# Patient Record
Sex: FEMALE | Race: WHITE | NOT HISPANIC OR LATINO | Employment: OTHER | URBAN - METROPOLITAN AREA
[De-identification: names, ages, dates, MRNs, and addresses within clinical notes are randomized per-mention and may not be internally consistent; named-entity substitution may affect disease eponyms.]

---

## 2017-01-04 ENCOUNTER — GENERIC CONVERSION - ENCOUNTER (OUTPATIENT)
Dept: OTHER | Facility: OTHER | Age: 67
End: 2017-01-04

## 2017-01-05 ENCOUNTER — GENERIC CONVERSION - ENCOUNTER (OUTPATIENT)
Dept: OTHER | Facility: OTHER | Age: 67
End: 2017-01-05

## 2017-01-31 ENCOUNTER — GENERIC CONVERSION - ENCOUNTER (OUTPATIENT)
Dept: OTHER | Facility: OTHER | Age: 67
End: 2017-01-31

## 2017-03-02 ENCOUNTER — GENERIC CONVERSION - ENCOUNTER (OUTPATIENT)
Dept: OTHER | Facility: OTHER | Age: 67
End: 2017-03-02

## 2017-03-09 ENCOUNTER — GENERIC CONVERSION - ENCOUNTER (OUTPATIENT)
Dept: OTHER | Facility: OTHER | Age: 67
End: 2017-03-09

## 2017-05-22 ENCOUNTER — GENERIC CONVERSION - ENCOUNTER (OUTPATIENT)
Dept: OTHER | Facility: OTHER | Age: 67
End: 2017-05-22

## 2017-06-19 ENCOUNTER — ALLSCRIPTS OFFICE VISIT (OUTPATIENT)
Dept: OTHER | Facility: OTHER | Age: 67
End: 2017-06-19

## 2017-08-29 ENCOUNTER — ALLSCRIPTS OFFICE VISIT (OUTPATIENT)
Dept: OTHER | Facility: OTHER | Age: 67
End: 2017-08-29

## 2017-08-29 DIAGNOSIS — C73 MALIGNANT NEOPLASM OF THYROID GLAND (HCC): ICD-10-CM

## 2017-09-02 ENCOUNTER — APPOINTMENT (OUTPATIENT)
Dept: LAB | Facility: CLINIC | Age: 67
End: 2017-09-02
Payer: MEDICARE

## 2017-09-02 DIAGNOSIS — C73 MALIGNANT NEOPLASM OF THYROID GLAND (HCC): ICD-10-CM

## 2017-09-02 LAB
T3 SERPL-MCNC: 1.1 NG/ML (ref 0.6–1.8)
T4 FREE SERPL-MCNC: 1.34 NG/DL (ref 0.76–1.46)
TSH SERPL DL<=0.05 MIU/L-ACNC: 0.01 UIU/ML (ref 0.36–3.74)

## 2017-09-02 PROCEDURE — 36415 COLL VENOUS BLD VENIPUNCTURE: CPT

## 2017-09-02 PROCEDURE — 84480 ASSAY TRIIODOTHYRONINE (T3): CPT

## 2017-09-02 PROCEDURE — 84443 ASSAY THYROID STIM HORMONE: CPT

## 2017-09-02 PROCEDURE — 84432 ASSAY OF THYROGLOBULIN: CPT

## 2017-09-02 PROCEDURE — 84439 ASSAY OF FREE THYROXINE: CPT

## 2017-09-02 PROCEDURE — 86800 THYROGLOBULIN ANTIBODY: CPT

## 2017-09-06 LAB
THYROGLOB AB SERPL-ACNC: <1 IU/ML (ref 0–0.9)
THYROGLOB SERPL-MCNC: <0.1 NG/ML (ref 1.5–38.5)

## 2017-09-11 ENCOUNTER — GENERIC CONVERSION - ENCOUNTER (OUTPATIENT)
Dept: OTHER | Facility: OTHER | Age: 67
End: 2017-09-11

## 2018-01-11 NOTE — RESULT NOTES
Verified Results  (1) THIN PREP PAP WITH IMAGING 13Jun2016 02:10PM Luciano Puga Order Number: [id number]     Test Name Result Flag Reference   LAB AP CASE REPORT (Report)     Gynecologic Cytology Report            Case: [id number]                  Authorizing Provider: Lele Seaman MD     Collected:      06/13/2016 1410        First Screen:     NUBIA Li Received:      06/15/2016 1518        Specimen:  LIQUID-BASED PAP, SCREENING, Cervix   LAB AP GYN PRIMARY INTERPRETATION      Negative for intraepithelial lesion or malignancy  Electronically signed by NUBIA Li on 6/22/2016 at 11:34 AM   LAB AP GYN SPECIMEN ADEQUACY      Satisfactory for evaluation  Endocervical/transformation zone component present  LAB AP GYN ADDITIONAL INFORMATION (Report)     [employer]'s FDA approved ,  and ThinPrep Imaging System are   utilized with strict adherence to the 's instruction manual to   prepare gynecologic and non-gynecologic cytology specimens for the   production of ThinPrep slides as well as for gynecologic ThinPrep imaging  These processes have been validated by our laboratory and/or by the     The Pap test is not a diagnostic procedure and should not be used as the   sole means to detect cervical cancer  It is only a screening procedure to   aid in the detection of cervical cancer and its precursors  Both   false-negative and false-positive results have been experienced  Your   patient's test result should be interpreted in this context together with   the history and clinical findings

## 2018-01-12 NOTE — RESULT NOTES
Verified Results  (1) T3 TOTAL [id number] 08:53AM Gatito Elizalde Order Number: [id number]     Test Name Result Flag Reference   T3 1 1 ng/mL  0 6-1 8     (1) T4, FREE [id number] 08:53AM Gatito Elizalde Order Number: [id number]     Test Name Result Flag Reference   T4,FREE 1 31 ng/dL  0 76-1 46     (1) THYROGLOBULIN/QUANT W/ANTIBODY PANEL [id number] 08:53AM Gatitomiko Elizalde Order Number: [id number]     Test Name Result Flag Reference   THYROGLOB AB <1 0 IU/mL  0 0 - 0 9   Thyroglobulin Antibody measured by [facility] Methodology    Performed at:  [address] [place] [address]  [id number]  : Yasmeen Andrews MD, Phone:  [phone]   THYROGLOBULIN-ARMAAN <0 1 ng/mL L 1 5 - 38 5   According to the [facility] of Clinical Biochemistry, the  reference interval for Thyroglobulin (TG) should be related to  euthyroid patients and not for patients who underwent thyroidectomy  TG reference intervals for these patients depend on the residual  mass of the thyroid tissue left after surgery  Establishing a  post-operative baseline is recommended    The assay limit of quantitation is 0 1 ng/mL  Thyroglobulin measured by [facility] Immunometric Assay    Performed at:  [address]  [id number]  : Yasmeen Andrews MD, Phone:  [phone]

## 2018-01-13 VITALS
DIASTOLIC BLOOD PRESSURE: 82 MMHG | HEIGHT: 62 IN | WEIGHT: 181.5 LBS | BODY MASS INDEX: 33.4 KG/M2 | SYSTOLIC BLOOD PRESSURE: 142 MMHG

## 2018-01-13 VITALS
WEIGHT: 184 LBS | HEIGHT: 62 IN | SYSTOLIC BLOOD PRESSURE: 158 MMHG | BODY MASS INDEX: 33.86 KG/M2 | DIASTOLIC BLOOD PRESSURE: 86 MMHG

## 2018-01-13 NOTE — RESULT NOTES
Verified Results  (1) COMPREHENSIVE METABOLIC PANEL [id number] 75:93KJ [employer] Order Number: [id number]     Test Name Result Flag Reference   GLUCOSE,RANDM 82 mg/dL     If the patient is fasting, the ADA then defines impaired fasting glucose as > 100 mg/dL and diabetes as > or equal to 123 mg/dL  SODIUM 138 mmol/L  136-145   POTASSIUM 4 7 mmol/L  3 5-5 3   CHLORIDE 104 mmol/L  100-108   CARBON DIOXIDE 29 mmol/L  21-32   ANION GAP (CALC) 5 mmol/L  4-13   BLOOD UREA NITROGEN 24 mg/dL  5-25   CREATININE 0 85 mg/dL  0 60-1 30   Standardized to IDMS reference method   CALCIUM 8 9 mg/dL  8 3-10 1   BILI, TOTAL 0 36 mg/dL  0 20-1 00   ALK PHOSPHATAS 100 U/L     ALT (SGPT) 29 U/L  12-78   AST(SGOT) 13 U/L  5-45   ALBUMIN 3 9 g/dL  3 5-5 0   TOTAL PROTEIN 7 8 g/dL  6 4-8 2   eGFR Non-African American      >60 0 ml/min/1 73sq m   - Patient Instructions: This is a fasting blood test  Water,black tea or black  coffee only after 9:00pm the night before test Drink 2 glasses of water the morning of test - Patient Instructions: This bloodwork is non-fasting  Please drink two glasses of   water morning of bloodwork  National Kidney Disease Education Program recommendations are as follows:  GFR calculation is accurate only with a steady state creatinine  Chronic Kidney disease less than 60 ml/min/1 73 sq  meters  Kidney failure less than 15 ml/min/1 73 sq  meters  (1) LIPID PANEL, FASTING [id number] 21:59BC [employer] Order Number: [id number]     Test Name Result Flag Reference   CHOLESTEROL 209 mg/dL H    HDL,DIRECT 62 mg/dL H 40-60   Specimen collection should occur prior to Metamizole administration due to the potential for falsely depressed results  LDL CHOLESTEROL CALCULATED 131 mg/dL H 0-100   - Patient Instructions:  This is a fasting blood test  Water,black tea or black  coffee only after 9:00pm the night before test   Drink 2 glasses of water the morning of test     - Patient Instructions: This is a fasting blood test  Water,black tea or black  coffee only after 9:00pm the night before test Drink 2 glasses of water the morning of test - Patient Instructions: This bloodwork is non-fasting  Please drink two glasses of   water morning of bloodwork  Triglyceride:         Normal              <150 mg/dl       Borderline High    150-199 mg/dl       High               200-499 mg/dl       Very High          >499 mg/dl  Cholesterol:         Desirable        <200 mg/dl      Borderline High  200-239 mg/dl      High             >239 mg/dl  HDL Cholesterol:        High    >59 mg/dL      Low     <41 mg/dL  LDL CALCULATED:    This screening LDL is a calculated result  It does not have the accuracy of the Direct Measured LDL in the monitoring of patients with hyperlipidemia and/or statin therapy  Direct Measure LDL (MJK137) must be ordered separately in these patients  TRIGLYCERIDES 81 mg/dL  <=150   Specimen collection should occur prior to N-Acetylcysteine or Metamizole administration due to the potential for falsely depressed results  (1) TSH [id number] 00:58TL [facility] Order Number: [id number]     Test Name Result Flag Reference   TSH <0 007 uIU/mL L 0 358-3 740   - Patient Instructions: This bloodwork is non-fasting  Please drink two glasses of water morning of bloodwork  - Patient Instructions: This is a fasting blood test  Water,black tea or black  coffee only after 9:00pm the night before test Drink 2 glasses of water the morning of test - Patient Instructions: This bloodwork is non-fasting  Please drink two glasses of   water morning of bloodwork  Patients undergoing fluorescein dye angiography may retain small amounts of fluorescein in the body for 48-72 hours post procedure  Samples containing fluorescein can produce falsely depressed TSH values   If the patient had this procedure,a specimen should be resubmitted post fluorescein clearance  The recommended reference ranges for TSH during pregnancy are as follows:  First trimester 0 1 to 2 5 uIU/mL  Second trimester  0 2 to 3 0 uIU/mL  Third trimester 0 3 to 3 0 uIU/m       Discussion/Summary   Blood work shows she needs reduction in thyroid medication  I will send in for 75 Âµg tablet  Recheck blood work in 4 months    Also her cholesterol is still slightly elevated patient increase pravastatin to 40 mg once daily I will send in to pharmacy

## 2018-01-16 NOTE — RESULT NOTES
Verified Results  (1) T3 TOTAL 02Sep2017 07:44AM Glendy Rona Order Number: [id number]     Test Name Result Flag Reference   T3 1 10 ng/mL  0 60-1 80     (1) T4, FREE 02Sep2017 07:44AM Glendy Rona Order Number: [id number]     Test Name Result Flag Reference   T4,FREE 1 34 ng/dL  0 76-1 46   Specimen collection should occur prior to Sulfasalazine administration due to the potential for falsely elevated results  (1) THYROGLOBULIN/QUANT W/ANTIBODY PANEL 02Sep2017 07:44AM Glendy Rona Order Number: [id number]     Test Name Result Flag Reference   THYROGLOB AB <1 0 IU/mL  0 0 - 0 9   Thyroglobulin Antibody measured by [facility] Methodology    Performed at:  [address] [place] [address]  [id number]  : Steve Burch MD, Phone:  [phone]   THYROGLOBULIN-ARMAAN <0 1 ng/mL L 1 5 - 38 5   According to the [facility] of Clinical Biochemistry, the  reference interval for Thyroglobulin (TG) should be related to  euthyroid patients and not for patients who underwent thyroidectomy  TG reference intervals for these patients depend on the residual  mass of the thyroid tissue left after surgery  Establishing a  post-operative baseline is recommended  The assay limit of quantitation is 0 1 ng/mL  Thyroglobulin measured by [facility] Immunometric Assay    Performed at:  [address] [place] [address]  [id number]  : Steve Burch MD, Phone:  [phone]     (1) TSH [id number] 07:44AM Glendy Rona Order Number: [id number]     Test Name Result Flag Reference   TSH 0 010 uIU/mL L 0 358-3 740   Patients undergoing fluorescein dye angiography may retain small amounts of fluorescein in the body for 48-72 hours post procedure  Samples containing fluorescein can produce falsely depressed TSH values   If the patient had this procedure,a specimen should be resubmitted post fluorescein clearance            The recommended reference ranges for TSH during pregnancy are as follows:  First trimester 0 1 to 2 5 uIU/mL  Second trimester  0 2 to 3 0 uIU/mL  Third trimester 0 3 to 3 0 uIU/m

## 2018-05-22 DIAGNOSIS — Z12.31 ENCOUNTER FOR SCREENING MAMMOGRAM FOR MALIGNANT NEOPLASM OF BREAST: ICD-10-CM

## 2018-06-25 ENCOUNTER — ANNUAL EXAM (OUTPATIENT)
Dept: OBGYN CLINIC | Facility: CLINIC | Age: 68
End: 2018-06-25
Payer: MEDICARE

## 2018-06-25 VITALS
WEIGHT: 181 LBS | BODY MASS INDEX: 33.31 KG/M2 | HEIGHT: 62 IN | SYSTOLIC BLOOD PRESSURE: 140 MMHG | DIASTOLIC BLOOD PRESSURE: 80 MMHG

## 2018-06-25 DIAGNOSIS — Z12.31 ENCOUNTER FOR SCREENING MAMMOGRAM FOR MALIGNANT NEOPLASM OF BREAST: Primary | ICD-10-CM

## 2018-06-25 DIAGNOSIS — Z01.419 ENCOUNTER FOR GYNECOLOGICAL EXAMINATION (GENERAL) (ROUTINE) WITHOUT ABNORMAL FINDINGS: ICD-10-CM

## 2018-06-25 PROCEDURE — G0145 SCR C/V CYTO,THINLAYER,RESCR: HCPCS | Performed by: OBSTETRICS & GYNECOLOGY

## 2018-06-25 PROCEDURE — G0101 CA SCREEN;PELVIC/BREAST EXAM: HCPCS | Performed by: OBSTETRICS & GYNECOLOGY

## 2018-06-25 RX ORDER — LOSARTAN POTASSIUM 100 MG/1
1 TABLET ORAL DAILY
COMMUNITY
Start: 2014-08-11 | End: 2019-01-14 | Stop reason: SDUPTHER

## 2018-06-25 RX ORDER — LEVOTHYROXINE SODIUM 0.1 MG/1
1 TABLET ORAL DAILY
COMMUNITY
Start: 2015-09-16 | End: 2018-12-26 | Stop reason: SDUPTHER

## 2018-06-25 RX ORDER — PRAVASTATIN SODIUM 40 MG
1 TABLET ORAL DAILY
COMMUNITY
Start: 2014-08-20 | End: 2018-07-30 | Stop reason: SDUPTHER

## 2018-06-25 RX ORDER — ASPIRIN 81 MG/1
TABLET ORAL
COMMUNITY
End: 2020-07-09

## 2018-06-25 RX ORDER — UBIDECARENONE 50 MG
CAPSULE ORAL
COMMUNITY

## 2018-06-25 RX ORDER — CHLORAL HYDRATE 500 MG
CAPSULE ORAL
COMMUNITY
Start: 2014-08-11

## 2018-06-25 NOTE — PATIENT INSTRUCTIONS
This 6 exam are normal  She will call if she sees any vaginal bleeding over the next year    9year-old patient was told that her breast and pelvic

## 2018-06-25 NOTE — PROGRESS NOTES
Assessment/Plan: This 59-year-old patient is seen for annual gyn evaluation  She has no complaints at this time  No problem-specific Assessment & Plan notes found for this encounter  Subjective:      Patient ID: Arben Farrar is a 79 y o  female  This 59-year-old patient has had no vaginal bleeding or episodes of vaginitis over the past year  Keota is comfortable with the use of lubricant  She has noticed decreased frequency of hot flashes over the past year  She has no chronic headaches or fainting spells  She usually is in bed at 10 at night 0 6 in the morning  Her bowel function is normal   She has had no urinary tract infections and does not lose urine when she laughs or coughs  She does not wear liners or pads every day  Review of Systems   Constitutional: Negative  HENT: Negative  Eyes: Negative  Respiratory: Negative  Cardiovascular: Negative  Gastrointestinal: Negative  Endocrine: Negative  Genitourinary: Negative  Musculoskeletal: Negative  Skin: Negative  Allergic/Immunologic: Negative  Neurological: Negative  Hematological: Negative  Psychiatric/Behavioral: Negative  Objective:      /80 (BP Location: Right arm, Patient Position: Sitting, Cuff Size: Standard)   Ht 5' 1 5" (1 562 m)   Wt 82 1 kg (181 lb)   BMI 33 65 kg/m²          Physical Exam   Constitutional: She is oriented to person, place, and time  She appears well-developed and well-nourished  HENT:   Head: Normocephalic  Neck: Normal range of motion  Neck supple  Cardiovascular: Normal rate, regular rhythm, normal heart sounds and intact distal pulses  Pulmonary/Chest: Effort normal and breath sounds normal    Abdominal: Soft  Bowel sounds are normal    Genitourinary: Uterus normal    Musculoskeletal: Normal range of motion  Neurological: She is alert and oriented to person, place, and time  Skin: Skin is warm and dry     Psychiatric: She has a normal mood and affect  Nursing note and vitals reviewed  breast exam is normal   Pelvic exam reveals uterus to be anterior mobile normal size  No adnexal masses are present  Cervix is normal to appearance  There is no blood or discharge in the vagina  The vulva is normal  Rectal exam shows no blood or  masses in the rectum and no nodularity in the cul-de-sac

## 2018-06-29 LAB
LAB AP GYN PRIMARY INTERPRETATION: NORMAL
Lab: NORMAL

## 2018-07-19 DIAGNOSIS — E78.5 HYPERLIPIDEMIA, UNSPECIFIED HYPERLIPIDEMIA TYPE: Primary | ICD-10-CM

## 2018-07-19 RX ORDER — PRAVASTATIN SODIUM 40 MG
TABLET ORAL
Qty: 90 TABLET | Refills: 1 | OUTPATIENT
Start: 2018-07-19

## 2018-07-19 NOTE — TELEPHONE ENCOUNTER
Patient is not been seen since 2016  She would need office visit as well as blood test for further evaluation before we could authorized any medications    I will print out prescription for patient

## 2018-07-26 ENCOUNTER — APPOINTMENT (OUTPATIENT)
Dept: LAB | Facility: CLINIC | Age: 68
End: 2018-07-26
Payer: MEDICARE

## 2018-07-26 DIAGNOSIS — E78.5 HYPERLIPIDEMIA, UNSPECIFIED HYPERLIPIDEMIA TYPE: ICD-10-CM

## 2018-07-26 LAB
ALBUMIN SERPL BCP-MCNC: 3.7 G/DL (ref 3.5–5)
ALP SERPL-CCNC: 101 U/L (ref 46–116)
ALT SERPL W P-5'-P-CCNC: 31 U/L (ref 12–78)
ANION GAP SERPL CALCULATED.3IONS-SCNC: 8 MMOL/L (ref 4–13)
AST SERPL W P-5'-P-CCNC: 17 U/L (ref 5–45)
BILIRUB SERPL-MCNC: 0.46 MG/DL (ref 0.2–1)
BUN SERPL-MCNC: 17 MG/DL (ref 5–25)
CALCIUM SERPL-MCNC: 8.5 MG/DL (ref 8.3–10.1)
CHLORIDE SERPL-SCNC: 105 MMOL/L (ref 100–108)
CHOLEST SERPL-MCNC: 175 MG/DL (ref 50–200)
CO2 SERPL-SCNC: 26 MMOL/L (ref 21–32)
CREAT SERPL-MCNC: 0.71 MG/DL (ref 0.6–1.3)
ERYTHROCYTE [DISTWIDTH] IN BLOOD BY AUTOMATED COUNT: 12.8 % (ref 11.6–15.1)
GFR SERPL CREATININE-BSD FRML MDRD: 88 ML/MIN/1.73SQ M
GLUCOSE P FAST SERPL-MCNC: 89 MG/DL (ref 65–99)
HCT VFR BLD AUTO: 39.8 % (ref 34.8–46.1)
HDLC SERPL-MCNC: 49 MG/DL (ref 40–60)
HGB BLD-MCNC: 12.7 G/DL (ref 11.5–15.4)
LDLC SERPL CALC-MCNC: 102 MG/DL (ref 0–100)
MCH RBC QN AUTO: 29.5 PG (ref 26.8–34.3)
MCHC RBC AUTO-ENTMCNC: 31.9 G/DL (ref 31.4–37.4)
MCV RBC AUTO: 92 FL (ref 82–98)
NONHDLC SERPL-MCNC: 126 MG/DL
PLATELET # BLD AUTO: 291 THOUSANDS/UL (ref 149–390)
PMV BLD AUTO: 10.1 FL (ref 8.9–12.7)
POTASSIUM SERPL-SCNC: 4.3 MMOL/L (ref 3.5–5.3)
PROT SERPL-MCNC: 7.3 G/DL (ref 6.4–8.2)
RBC # BLD AUTO: 4.31 MILLION/UL (ref 3.81–5.12)
SODIUM SERPL-SCNC: 139 MMOL/L (ref 136–145)
TRIGL SERPL-MCNC: 120 MG/DL
TSH SERPL DL<=0.05 MIU/L-ACNC: 0.01 UIU/ML (ref 0.36–3.74)
WBC # BLD AUTO: 7.27 THOUSAND/UL (ref 4.31–10.16)

## 2018-07-26 PROCEDURE — 80053 COMPREHEN METABOLIC PANEL: CPT

## 2018-07-26 PROCEDURE — 80061 LIPID PANEL: CPT

## 2018-07-26 PROCEDURE — 84443 ASSAY THYROID STIM HORMONE: CPT

## 2018-07-26 PROCEDURE — 36415 COLL VENOUS BLD VENIPUNCTURE: CPT

## 2018-07-26 PROCEDURE — 85027 COMPLETE CBC AUTOMATED: CPT

## 2018-07-30 ENCOUNTER — OFFICE VISIT (OUTPATIENT)
Dept: FAMILY MEDICINE CLINIC | Facility: CLINIC | Age: 68
End: 2018-07-30
Payer: MEDICARE

## 2018-07-30 VITALS
SYSTOLIC BLOOD PRESSURE: 110 MMHG | DIASTOLIC BLOOD PRESSURE: 70 MMHG | HEART RATE: 84 BPM | WEIGHT: 179.6 LBS | BODY MASS INDEX: 33.05 KG/M2 | RESPIRATION RATE: 16 BRPM | HEIGHT: 62 IN | TEMPERATURE: 96.7 F

## 2018-07-30 DIAGNOSIS — E05.90 SUBCLINICAL HYPERTHYROIDISM: ICD-10-CM

## 2018-07-30 DIAGNOSIS — I10 BENIGN ESSENTIAL HYPERTENSION: ICD-10-CM

## 2018-07-30 DIAGNOSIS — E78.5 HYPERLIPIDEMIA, UNSPECIFIED HYPERLIPIDEMIA TYPE: Primary | ICD-10-CM

## 2018-07-30 PROCEDURE — 99214 OFFICE O/P EST MOD 30 MIN: CPT | Performed by: FAMILY MEDICINE

## 2018-07-30 RX ORDER — PRAVASTATIN SODIUM 40 MG
40 TABLET ORAL DAILY
Qty: 90 TABLET | Refills: 3 | Status: SHIPPED | OUTPATIENT
Start: 2018-07-30 | End: 2019-07-01 | Stop reason: SDUPTHER

## 2018-07-30 NOTE — ASSESSMENT & PLAN NOTE
Hypertension    Patient blood pressure is stable at this time and she will continue current regimen of medications

## 2018-07-30 NOTE — ASSESSMENT & PLAN NOTE
Hyperlipidemia  Patient's lipid panel stable on current dose of statin therapy    A refill was placed for her Pravachol medication

## 2018-07-30 NOTE — PROGRESS NOTES
FAMILY PRACTICE OFFICE VISIT       NAME: Dionna Other  AGE: 79 y o  SEX: female       : 1950        MRN: [id number]    DATE: 2018  TIME: 5:21 PM    Assessment and Plan     Problem List Items Addressed This Visit     Benign essential hypertension       Hypertension  Patient blood pressure is stable at this time and she will continue current regimen of medications         Hyperlipidemia - Primary      Hyperlipidemia  Patient's lipid panel stable on current dose of statin therapy  A refill was placed for her Pravachol medication         Relevant Medications    pravastatin (PRAVACHOL) 40 mg tablet    Subclinical hyperthyroidism      Subclinical hyperthyroidism  Patient with no specific symptoms related to hyperthyroidism  She will follow up with her general surgeon and inquire whether she needs an adjustment on her levothyroxine medication                 There are no Patient Instructions on file for this visit  Chief Complaint     Chief Complaint   Patient presents with    Follow-up     Patient is here for a follow up to discuss her blood work results  History of Present Illness      Patient denies any recent illness  She still sees her general surgeon for her previous thyroid condition  I reviewed her most recent blood test results  She still sees her gynecologist on a regular basis  Review of Systems   Review of Systems   Constitutional: Negative  HENT:        As per HPI   Respiratory: Negative  Cardiovascular: Negative  Gastrointestinal: Negative  Genitourinary: Negative  Musculoskeletal: Negative  Neurological: Negative  Psychiatric/Behavioral: Negative          Active Problem List     Patient Active Problem List   Diagnosis    Benign essential hypertension    [facility]    Hyperlipidemia    Subclinical hyperthyroidism       Past Medical History:  Past Medical History:   Diagnosis Date    Carcinoma of thyroid ([facility] )     Last Assessed:  8/29/17       Past Surgical History:  Past Surgical History:   Procedure Laterality Date    DIAGNOSTIC LAPAROSCOPY      EXPLORATORY LAPAROTOMY      SALPINGOOPHORECTOMY Left     TOTAL THYROIDECTOMY      TUBAL LIGATION         Family History:  Family History   Problem Relation Age of Onset    Colon cancer Mother     Heart disease Father     Other Father         Stroke syndrome    Colon cancer Brother     Other Son         Brain tumor       Social History:  Social History     Social History    Marital status: /Civil Union     Spouse name: N/A    Number of children: N/A    Years of education: N/A     Occupational History    retired      Social History Main Topics    Smoking status: Never Smoker    Smokeless tobacco: Never Used    Alcohol use Yes      Comment: Social drinker    Drug use: No    Sexual activity: Yes     Partners: Male     Other Topics Concern    Not on file     Social History Narrative    Caffeine use    Uses safety equipment - seatbelts       Objective     Vitals:    07/30/18 1018   BP: 110/70   Pulse:    Resp:    Temp:      Wt Readings from Last 3 Encounters:   07/30/18 81 5 kg (179 lb 9 6 oz)   06/25/18 82 1 kg (181 lb)   08/29/17 82 3 kg (181 lb 8 oz)       Physical Exam   Constitutional: No distress  HENT:   Mouth/Throat: Oropharynx is clear and moist  No oropharyngeal exudate  Cardiovascular: Normal rate and regular rhythm  Pulmonary/Chest: Effort normal and breath sounds normal  No respiratory distress  She has no wheezes  She has no rales  Abdominal: Soft  Bowel sounds are normal  She exhibits no distension and no mass  There is no tenderness  There is no rebound and no guarding  Musculoskeletal: She exhibits no edema  Lymphadenopathy:     She has no cervical adenopathy         Pertinent Laboratory/Diagnostic Studies:  Lab Results   Component Value Date    GLUCOSE 82 08/12/2016    BUN 17 07/26/2018    CREATININE 0 71 07/26/2018    CALCIUM 8 5 07/26/2018     07/26/2018    K 4 3 07/26/2018    CO2 26 07/26/2018     07/26/2018     Lab Results   Component Value Date    ALT 31 07/26/2018    AST 17 07/26/2018    ALKPHOS 101 07/26/2018    BILITOT 0 46 07/26/2018       Lab Results   Component Value Date    WBC 7 27 07/26/2018    HGB 12 7 07/26/2018    HCT 39 8 07/26/2018    MCV 92 07/26/2018     07/26/2018       No results found for: TSH    Lab Results   Component Value Date    CHOL 175 07/26/2018     Lab Results   Component Value Date    TRIG 120 07/26/2018     Lab Results   Component Value Date    HDL 49 07/26/2018     Lab Results   Component Value Date    LDLCALC 102 (H) 07/26/2018     No results found for: HGBA1C    Results for orders placed or performed in visit on 07/26/18   CBC   Result Value Ref Range    WBC 7 27 4 31 - 10 16 Thousand/uL    RBC 4 31 3 81 - 5 12 Million/uL    Hemoglobin 12 7 11 5 - 15 4 g/dL    Hematocrit 39 8 34 8 - 46 1 %    MCV 92 82 - 98 fL    MCH 29 5 26 8 - 34 3 pg    MCHC 31 9 31 4 - 37 4 g/dL    RDW 12 8 11 6 - 15 1 %    Platelets 163 519 - 212 Thousands/uL    MPV 10 1 8 9 - 12 7 fL   Comprehensive metabolic panel   Result Value Ref Range    Sodium 139 136 - 145 mmol/L    Potassium 4 3 3 5 - 5 3 mmol/L    Chloride 105 100 - 108 mmol/L    CO2 26 21 - 32 mmol/L    Anion Gap 8 4 - 13 mmol/L    BUN 17 5 - 25 mg/dL    Creatinine 0 71 0 60 - 1 30 mg/dL    Glucose, Fasting 89 65 - 99 mg/dL    Calcium 8 5 8 3 - 10 1 mg/dL    AST 17 5 - 45 U/L    ALT 31 12 - 78 U/L    Alkaline Phosphatase 101 46 - 116 U/L    Total Protein 7 3 6 4 - 8 2 g/dL    Albumin 3 7 3 5 - 5 0 g/dL    Total Bilirubin 0 46 0 20 - 1 00 mg/dL    eGFR 88 ml/min/1 73sq m   Lipid panel   Result Value Ref Range    Cholesterol 175 50 - 200 mg/dL    Triglycerides 120 <=150 mg/dL    HDL, Direct 49 40 - 60 mg/dL    LDL Calculated 102 (H) 0 - 100 mg/dL    Non-HDL-Chol (CHOL-HDL) 126 mg/dl   TSH, 3rd generation   Result Value Ref Range    TSH 3RD GENERATON 0 007 (L) 0 358 - 3 740 uIU/mL       No orders of the defined types were placed in this encounter  ALLERGIES:  No Known Allergies    Current Medications     Current Outpatient Prescriptions   Medication Sig Dispense Refill    aspirin (ADULT ASPIRIN EC LOW STRENGTH) 81 mg EC tablet Take by mouth      Glucosamine-Chondroit-Vit C-Mn (TH GLUCOSAMINE-CHONDROITIN) CAPS Take by mouth      levothyroxine 100 mcg tablet Take 1 tablet by mouth daily      losartan (COZAAR) 100 MG tablet Take 1 tablet by mouth daily      MULTIPLE VITAMINS PO Take by mouth      Omega-3 Fatty Acids (FISH OIL) 1,000 mg Take by mouth      pravastatin (PRAVACHOL) 40 mg tablet Take 1 tablet (40 mg total) by mouth daily 90 tablet 3    Red Yeast Rice 600 MG TABS Take by mouth       No current facility-administered medications for this visit  Health Maintenance     Health Maintenance   Topic Date Due    Hepatitis C Screening  1950    Depression Screening PHQ-9  1950    SLP PLAN OF CARE  1950    CRC Screening: Colonoscopy  1950    DTaP,Tdap,and Td Vaccines (1 - Tdap) 09/01/1971    Fall Risk  09/01/2015    Urinary Incontinence Screening  09/01/2015    PNEUMOCOCCAL POLYSACCHARIDE VACCINE AGE 65 AND OVER  09/01/2015    GLAUCOMA SCREENING 65 + YR  09/01/2017    INFLUENZA VACCINE  09/01/2018       There is no immunization history on file for this patient      Breann Sheffield MD

## 2018-07-30 NOTE — ASSESSMENT & PLAN NOTE
Subclinical hyperthyroidism  Patient with no specific symptoms related to hyperthyroidism    She will follow up with her general surgeon and inquire whether she needs an adjustment on her levothyroxine medication

## 2018-07-31 ENCOUNTER — TELEPHONE (OUTPATIENT)
Dept: FAMILY MEDICINE CLINIC | Facility: CLINIC | Age: 68
End: 2018-07-31

## 2018-07-31 NOTE — TELEPHONE ENCOUNTER
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 Please call to advise

## 2018-08-03 NOTE — TELEPHONE ENCOUNTER
I recommend seeing [facility] Oral surgery office for further evaluation    Please provide phone number so patient may call for appointment

## 2018-09-25 ENCOUNTER — OFFICE VISIT (OUTPATIENT)
Dept: SURGERY | Facility: CLINIC | Age: 68
End: 2018-09-25
Payer: MEDICARE

## 2018-09-25 VITALS — WEIGHT: 172.4 LBS | HEIGHT: 62 IN | BODY MASS INDEX: 31.73 KG/M2 | TEMPERATURE: 98.5 F

## 2018-09-25 DIAGNOSIS — C73 CARCINOMA OF THYROID (HCC): ICD-10-CM

## 2018-09-25 DIAGNOSIS — Z85.850 HISTORY OF THYROID CANCER: Primary | ICD-10-CM

## 2018-09-25 PROCEDURE — 99213 OFFICE O/P EST LOW 20 MIN: CPT | Performed by: SURGERY

## 2018-09-25 NOTE — ASSESSMENT & PLAN NOTE
Doing well no evidence of disease  There is a question of a current dose of thyroid medication  Will order her T3-T4 and thyroglobulin panel and evaluate the dose  Currently she is on 100 mcg daily  Ferol Ermias He may be able to back off a little bit if the T3 and T4 appropriate    See back in a year's time

## 2018-09-25 NOTE — PROGRESS NOTES
Office Visit - General Surgery  Deena Ramírez MRN: [id number]  Encounter: [id number]    Assessment and Plan    Problem List Items Addressed This Visit        Endocrine    Carcinoma of thyroid [facility])     Doing well no evidence of disease  There is a question of a current dose of thyroid medication  Will order her T3-T4 and thyroglobulin panel and evaluate the dose  Currently she is on 100 mcg daily  Anupama Malhotra He may be able to back off a little bit if the T3 and T4 appropriate  See back in a year's time           Other Visit Diagnoses     History of thyroid cancer    -  Primary    Relevant Orders    T3, free    Thyroglobulin Panel    TSH, 3rd generation with Free T4 reflex          Chief Complaint:  Deena Ramírez is a 76 y o  female who presents for Thyroid Problem (Yearly thyroid check  tsh on 7-26-18)    Subjective  59-year-old female follow-up for thyroid cancer  Feels well with no thyroid symptoms      Past Medical History  Past Medical History:   Diagnosis Date    Carcinoma of thyroid ([facility] [address] )     Last Assessed:  8/29/17       Past Surgical History  Past Surgical History:   Procedure Laterality Date    DIAGNOSTIC LAPAROSCOPY      EXPLORATORY LAPAROTOMY      SALPINGOOPHORECTOMY Left     TOTAL THYROIDECTOMY      TUBAL LIGATION         Family History  Family History   Problem Relation Age of Onset    Colon cancer Mother     Heart disease Father     Other Father         Stroke syndrome    Colon cancer Brother     Other Son         Brain tumor       Medications  Current Outpatient Prescriptions on File Prior to Visit   Medication Sig Dispense Refill    aspirin (ADULT ASPIRIN EC LOW STRENGTH) 81 mg EC tablet Take by mouth      Glucosamine-Chondroit-Vit C-Mn (TH GLUCOSAMINE-CHONDROITIN) CAPS Take by mouth      levothyroxine 100 mcg tablet Take 1 tablet by mouth daily      losartan (COZAAR) 100 MG tablet Take 1 tablet by mouth daily      MULTIPLE VITAMINS PO Take by mouth      Omega-3 Fatty Acids (FISH OIL) 1,000 mg Take by mouth      pravastatin (PRAVACHOL) 40 mg tablet Take 1 tablet (40 mg total) by mouth daily 90 tablet 3    Red Yeast Rice 600 MG TABS Take by mouth       No current facility-administered medications on file prior to visit  Allergies  No Known Allergies    Review of Systems   Constitutional: Negative for chills and fever  HENT: Negative for trouble swallowing and voice change  Eyes: Negative for pain and visual disturbance  Respiratory: Negative for cough and shortness of breath  Cardiovascular: Negative for chest pain and leg swelling  Gastrointestinal: Negative for abdominal pain, nausea and vomiting  Endocrine: Negative for cold intolerance, heat intolerance, polydipsia, polyphagia and polyuria  Genitourinary: Negative for difficulty urinating and flank pain  Musculoskeletal: Negative for arthralgias and gait problem  Skin: Negative for rash and wound  Allergic/Immunologic: Negative for food allergies  Neurological: Negative for seizures, syncope, weakness and headaches  Hematological: Negative for adenopathy  Psychiatric/Behavioral: Negative for confusion  All other systems reviewed and are negative  Objective  Vitals:    09/25/18 0843   Temp: 98 5 °F (36 9 °C)       Physical Exam   Constitutional: She is oriented to person, place, and time  She appears well-developed and well-nourished  HENT:   Head: Normocephalic and atraumatic  Right Ear: External ear normal    Left Ear: External ear normal    Eyes: Conjunctivae are normal  No scleral icterus  Neck: Neck supple  No tracheal deviation present  No thyromegaly present  Well-healed low anterior neck incision no thyroid palpable   Cardiovascular: Normal rate, regular rhythm and normal heart sounds  Exam reveals no friction rub  No murmur heard  Pulmonary/Chest: Effort normal and breath sounds normal  No respiratory distress  She has no wheezes  She has no rales  Abdominal: Soft   She exhibits no mass  There is no tenderness  There is no rebound and no guarding  Musculoskeletal: Normal range of motion  She exhibits no edema  Lymphadenopathy:     She has no cervical adenopathy  Neurological: She is alert and oriented to person, place, and time  Skin: Skin is warm and dry  Psychiatric: She has a normal mood and affect  Her behavior is normal  Judgment and thought content normal    Nursing note and vitals reviewed

## 2018-09-25 NOTE — LETTER
September 25, 9662     [address] [place] [address]    Patient: Micheline Escalante   YOB: 1950   Date of Visit: 9/25/2018       Dear Dr Meaghan Garcia: Thank you for referring Micheline Escalante to me for evaluation  Below are my notes for this consultation  If you have questions, please do not hesitate to call me  I look forward to following your patient along with you  Sincerely,        Vera Perry MD        CC: No Recipients  Vera Perry MD  9/25/2018  9:09 AM  Sign at close encounter  Office Visit - General Surgery  Mciheline Escalante MRN: [id number]  Encounter: [id number]    Assessment and Plan    Problem List Items Addressed This Visit        Endocrine    Carcinoma of thyroid [facility])     Doing well no evidence of disease  There is a question of a current dose of thyroid medication  Will order her T3-T4 and thyroglobulin panel and evaluate the dose  Currently she is on 100 mcg daily  Jerryl Rm He may be able to back off a little bit if the T3 and T4 appropriate  See back in a year's time           Other Visit Diagnoses     History of thyroid cancer    -  Primary    Relevant Orders    T3, free    Thyroglobulin Panel    TSH, 3rd generation with Free T4 reflex          Chief Complaint:  Micheline Escalante is a 76 y o  female who presents for Thyroid Problem (Yearly thyroid check  tsh on 7-26-18)    Subjective  60-year-old female follow-up for thyroid cancer  Feels well with no thyroid symptoms      Past Medical History  Past Medical History:   Diagnosis Date    Carcinoma of thyroid ([address] )     Last Assessed:  8/29/17       Past Surgical History  Past Surgical History:   Procedure Laterality Date    DIAGNOSTIC LAPAROSCOPY      EXPLORATORY LAPAROTOMY      SALPINGOOPHORECTOMY Left     TOTAL THYROIDECTOMY      TUBAL LIGATION         Family History  Family History   Problem Relation Age of Onset    Colon cancer Mother     Heart disease Father     Other Father         Stroke syndrome    Colon cancer Brother     Other Son         Brain tumor       Medications  Current Outpatient Prescriptions on File Prior to Visit   Medication Sig Dispense Refill    aspirin (ADULT ASPIRIN EC LOW STRENGTH) 81 mg EC tablet Take by mouth      Glucosamine-Chondroit-Vit C-Mn (TH GLUCOSAMINE-CHONDROITIN) CAPS Take by mouth      levothyroxine 100 mcg tablet Take 1 tablet by mouth daily      losartan (COZAAR) 100 MG tablet Take 1 tablet by mouth daily      MULTIPLE VITAMINS PO Take by mouth      Omega-3 Fatty Acids (FISH OIL) 1,000 mg Take by mouth      pravastatin (PRAVACHOL) 40 mg tablet Take 1 tablet (40 mg total) by mouth daily 90 tablet 3    Red Yeast Rice 600 MG TABS Take by mouth       No current facility-administered medications on file prior to visit  Allergies  No Known Allergies    Review of Systems   Constitutional: Negative for chills and fever  HENT: Negative for trouble swallowing and voice change  Eyes: Negative for pain and visual disturbance  Respiratory: Negative for cough and shortness of breath  Cardiovascular: Negative for chest pain and leg swelling  Gastrointestinal: Negative for abdominal pain, nausea and vomiting  Endocrine: Negative for cold intolerance, heat intolerance, polydipsia, polyphagia and polyuria  Genitourinary: Negative for difficulty urinating and flank pain  Musculoskeletal: Negative for arthralgias and gait problem  Skin: Negative for rash and wound  Allergic/Immunologic: Negative for food allergies  Neurological: Negative for seizures, syncope, weakness and headaches  Hematological: Negative for adenopathy  Psychiatric/Behavioral: Negative for confusion  All other systems reviewed and are negative  Objective  Vitals:    09/25/18 0843   Temp: 98 5 °F (36 9 °C)       Physical Exam   Constitutional: She is oriented to person, place, and time  She appears well-developed and well-nourished     HENT:   Head: Normocephalic and atraumatic  Right Ear: External ear normal    Left Ear: External ear normal    Eyes: Conjunctivae are normal  No scleral icterus  Neck: Neck supple  No tracheal deviation present  No thyromegaly present  Well-healed low anterior neck incision no thyroid palpable   Cardiovascular: Normal rate, regular rhythm and normal heart sounds  Exam reveals no friction rub  No murmur heard  Pulmonary/Chest: Effort normal and breath sounds normal  No respiratory distress  She has no wheezes  She has no rales  Abdominal: Soft  She exhibits no mass  There is no tenderness  There is no rebound and no guarding  Musculoskeletal: Normal range of motion  She exhibits no edema  Lymphadenopathy:     She has no cervical adenopathy  Neurological: She is alert and oriented to person, place, and time  Skin: Skin is warm and dry  Psychiatric: She has a normal mood and affect  Her behavior is normal  Judgment and thought content normal    Nursing note and vitals reviewed

## 2018-09-27 ENCOUNTER — TRANSCRIBE ORDERS (OUTPATIENT)
Dept: LAB | Facility: CLINIC | Age: 68
End: 2018-09-27

## 2018-09-27 ENCOUNTER — APPOINTMENT (OUTPATIENT)
Dept: LAB | Facility: CLINIC | Age: 68
End: 2018-09-27
Payer: MEDICARE

## 2018-09-27 DIAGNOSIS — Z85.850 HX OF THYROID CANCER: ICD-10-CM

## 2018-09-27 DIAGNOSIS — Z85.850 HISTORY OF THYROID CANCER: ICD-10-CM

## 2018-09-27 DIAGNOSIS — Z85.850 HX OF THYROID CANCER: Primary | ICD-10-CM

## 2018-09-27 LAB
T3FREE SERPL-MCNC: 3.26 PG/ML (ref 2.3–4.2)
T4 FREE SERPL-MCNC: 1.28 NG/DL (ref 0.76–1.46)
TSH SERPL DL<=0.05 MIU/L-ACNC: 0.01 UIU/ML (ref 0.36–3.74)

## 2018-09-27 PROCEDURE — 86800 THYROGLOBULIN ANTIBODY: CPT

## 2018-09-27 PROCEDURE — 84432 ASSAY OF THYROGLOBULIN: CPT

## 2018-09-27 PROCEDURE — 84443 ASSAY THYROID STIM HORMONE: CPT | Performed by: SURGERY

## 2018-09-27 PROCEDURE — 84439 ASSAY OF FREE THYROXINE: CPT

## 2018-09-27 PROCEDURE — 36415 COLL VENOUS BLD VENIPUNCTURE: CPT | Performed by: SURGERY

## 2018-09-27 PROCEDURE — 84481 FREE ASSAY (FT-3): CPT | Performed by: SURGERY

## 2018-09-28 LAB
THYROGLOB AB SERPL-ACNC: <1 IU/ML (ref 0–0.9)
THYROGLOB SERPL-MCNC: <0.1 NG/ML (ref 1.5–38.5)

## 2018-10-02 NOTE — PROGRESS NOTES
Patient notified that thyroid labs look good and to stay on same dose of medication   Silva Hernández

## 2018-12-21 ENCOUNTER — TELEPHONE (OUTPATIENT)
Dept: SURGERY | Facility: CLINIC | Age: 68
End: 2018-12-21

## 2018-12-21 NOTE — TELEPHONE ENCOUNTER
Patient calling for refill on her Levothyroxine 100 mcg 1 tablet daily  Please make sure it is sent to [address] for 3 Months  Please call patient when completed

## 2018-12-26 DIAGNOSIS — Z85.850 HISTORY OF THYROID CANCER: Primary | ICD-10-CM

## 2018-12-26 RX ORDER — LEVOTHYROXINE SODIUM 0.1 MG/1
100 TABLET ORAL DAILY
Qty: 90 TABLET | Refills: 3 | Status: SHIPPED | OUTPATIENT
Start: 2018-12-26 | End: 2020-01-09

## 2019-01-14 DIAGNOSIS — I10 ESSENTIAL HYPERTENSION: Primary | ICD-10-CM

## 2019-01-14 RX ORDER — LOSARTAN POTASSIUM 100 MG/1
TABLET ORAL
Qty: 90 TABLET | Refills: 1 | Status: SHIPPED | OUTPATIENT
Start: 2019-01-14 | End: 2019-07-01 | Stop reason: SDUPTHER

## 2019-06-13 DIAGNOSIS — Z12.31 ENCOUNTER FOR SCREENING MAMMOGRAM FOR MALIGNANT NEOPLASM OF BREAST: ICD-10-CM

## 2019-07-01 ENCOUNTER — ANNUAL EXAM (OUTPATIENT)
Dept: OBGYN CLINIC | Facility: CLINIC | Age: 69
End: 2019-07-01
Payer: MEDICARE

## 2019-07-01 VITALS
DIASTOLIC BLOOD PRESSURE: 78 MMHG | SYSTOLIC BLOOD PRESSURE: 146 MMHG | HEIGHT: 62 IN | WEIGHT: 179 LBS | BODY MASS INDEX: 32.94 KG/M2

## 2019-07-01 DIAGNOSIS — Z01.419 ENCOUNTER FOR GYNECOLOGICAL EXAMINATION (GENERAL) (ROUTINE) WITHOUT ABNORMAL FINDINGS: ICD-10-CM

## 2019-07-01 DIAGNOSIS — Z12.31 ENCOUNTER FOR SCREENING MAMMOGRAM FOR MALIGNANT NEOPLASM OF BREAST: Primary | ICD-10-CM

## 2019-07-01 DIAGNOSIS — E78.5 HYPERLIPIDEMIA, UNSPECIFIED HYPERLIPIDEMIA TYPE: ICD-10-CM

## 2019-07-01 DIAGNOSIS — I10 ESSENTIAL HYPERTENSION: ICD-10-CM

## 2019-07-01 PROCEDURE — G0101 CA SCREEN;PELVIC/BREAST EXAM: HCPCS | Performed by: OBSTETRICS & GYNECOLOGY

## 2019-07-01 RX ORDER — PRAVASTATIN SODIUM 40 MG
TABLET ORAL
Qty: 90 TABLET | Refills: 0 | Status: SHIPPED | OUTPATIENT
Start: 2019-07-01 | End: 2019-10-24 | Stop reason: SDUPTHER

## 2019-07-01 RX ORDER — LOSARTAN POTASSIUM 100 MG/1
TABLET ORAL
Qty: 90 TABLET | Refills: 0 | Status: SHIPPED | OUTPATIENT
Start: 2019-07-01 | End: 2019-10-24 | Stop reason: SDUPTHER

## 2019-07-01 NOTE — PATIENT INSTRUCTIONS
This 51-year-old patient was told that her breast and pelvic exam are normal  She will call if she sees any vaginal bleeding over the next year

## 2019-07-01 NOTE — PROGRESS NOTES
Assessment/Plan: This 77-year-old patient this 77-year-old patient is seen for gyn evaluation at this time  Does receive 3D mammography evaluate her breasts  She still has occasional mild hot flashes  No problem-specific Assessment & Plan notes found for this encounter  Subjective:      Patient ID: Marquise Young is a 76 y o  female  This 77-year-old patient is seen for gyn evaluation  She has had no vaginal bleeding or episodes of vaginitis over the past year  She has no chronic headaches or fainting spells  She does have occasional hot flashes yet  [place] is occurring and is comfortable with the use of lubricant  Her bowel function is normal and she does not bleed with bowel movements  She has no urine stress incontinence and does not wear pads or liners  She has had no urinary tract infections over the past year  She had a mammogram June 13, 2019 which was 3D mammography and was normal   Her weight is 179 lb are blood pressure 146/78  Review of Systems   Constitutional: Negative  HENT: Negative  Eyes: Negative  Respiratory: Negative  Cardiovascular: Negative  Gastrointestinal: Negative  Endocrine: Negative  Genitourinary: Negative  Musculoskeletal: Negative  Skin: Negative  Allergic/Immunologic: Negative  Neurological: Negative  Hematological: Negative  Psychiatric/Behavioral: Negative  Objective:      /78 (BP Location: Left arm, Patient Position: Sitting, Cuff Size: Standard)   Ht 5' 2" (1 575 m)   Wt 81 2 kg (179 lb)   BMI 32 74 kg/m²          Physical Exam   Constitutional: She is oriented to person, place, and time  She appears well-developed and well-nourished  HENT:   Head: Normocephalic  Neck: Normal range of motion  Neck supple  Cardiovascular: Normal rate, regular rhythm, normal heart sounds and intact distal pulses  Pulmonary/Chest: Effort normal and breath sounds normal    Abdominal: Soft   Bowel sounds are normal    Genitourinary: Uterus normal    Musculoskeletal: Normal range of motion  Neurological: She is alert and oriented to person, place, and time  Skin: Skin is warm and dry  Psychiatric: She has a normal mood and affect  Nursing note and vitals reviewed  breast exam is normal   Pelvic exam reveals uterus to be anterior mobile normal size  There are no adnexal masses identified  Cervix is normal to appearance  There is no blood or discharge in the vagina  The vulva is normal  Rectal exam shows no masses in the rectum and no blood in the rectum  There is no nodularity in the cul-de-sac

## 2019-10-07 DIAGNOSIS — E78.5 HYPERLIPIDEMIA, UNSPECIFIED HYPERLIPIDEMIA TYPE: ICD-10-CM

## 2019-10-07 DIAGNOSIS — I10 ESSENTIAL HYPERTENSION: ICD-10-CM

## 2019-10-07 RX ORDER — PRAVASTATIN SODIUM 40 MG
TABLET ORAL
Qty: 90 TABLET | Refills: 0 | OUTPATIENT
Start: 2019-10-07

## 2019-10-07 RX ORDER — LOSARTAN POTASSIUM 100 MG/1
TABLET ORAL
Qty: 90 TABLET | Refills: 0 | OUTPATIENT
Start: 2019-10-07

## 2019-10-09 ENCOUNTER — OFFICE VISIT (OUTPATIENT)
Dept: SURGERY | Facility: CLINIC | Age: 69
End: 2019-10-09
Payer: MEDICARE

## 2019-10-09 VITALS
WEIGHT: 175.8 LBS | BODY MASS INDEX: 32.35 KG/M2 | HEART RATE: 68 BPM | SYSTOLIC BLOOD PRESSURE: 148 MMHG | DIASTOLIC BLOOD PRESSURE: 88 MMHG | HEIGHT: 62 IN | TEMPERATURE: 97.6 F

## 2019-10-09 DIAGNOSIS — Z85.850 HISTORY OF THYROID CANCER: Primary | ICD-10-CM

## 2019-10-09 PROCEDURE — 99213 OFFICE O/P EST LOW 20 MIN: CPT | Performed by: SURGERY

## 2019-10-09 NOTE — ASSESSMENT & PLAN NOTE
Everything seems to be stable at this point time  Will check her labs  He may be reasonable to let her at [facility] come up a little bit and cut back on her levothyroxine  Will see what her labs show  Otherwise doing well see her back in a year's time

## 2019-10-09 NOTE — PROGRESS NOTES
Office Visit - General Surgery  Delfina Guerrero MRN: [id number]  Encounter: [id number]    Assessment and Plan    Problem List Items Addressed This Visit        Other    History of thyroid cancer - Primary     Everything seems to be stable at this point time  Will check her labs  He may be reasonable to let her at [facility] come up a little bit and cut back on her levothyroxine  Will see what her labs show  Otherwise doing well see her back in a year's time  Relevant Orders    T3, free    T4, free    Thyroglobulin Panel    Thyroid Antibodies Panel    TSH, 3rd generation with Free T4 reflex          Chief Complaint:  Delfina Guerrero is a 71 y o  female who presents for Thyroid Problem (Yearly thyroid check)    Subjective  35-year-old female comes in follow-up thyroid cancer status post total thyroidectomy  Current and 100 mcg of levothyroxine daily  Does well on this dose  No hyper or hypo or compressive symptoms    Past Medical History  Past Medical History:   Diagnosis Date    Carcinoma of thyroid ([facility] [address] )     Last Assessed:  8/29/17       Past Surgical History  Past Surgical History:   Procedure Laterality Date    DIAGNOSTIC LAPAROSCOPY      EXPLORATORY LAPAROTOMY      SALPINGOOPHORECTOMY Left     TOTAL THYROIDECTOMY      TUBAL LIGATION         Family History  Family History   Problem Relation Age of Onset    Colon cancer Mother     Heart disease Father     Other Father         Stroke syndrome    Colon cancer Brother     Other Son         Brain tumor       Social History  Social History     Socioeconomic History    Marital status: /Civil Union     Spouse name: None    Number of children: None    Years of education: None    Highest education level: None   Occupational History    Occupation: retired   Social Needs    Financial resource strain: None    Food insecurity:     Worry: None     Inability: None    Transportation needs:     Medical: None     Non-medical: None   Tobacco Use    Smoking status: Never Smoker    Smokeless tobacco: Never Used   Substance and Sexual Activity    Alcohol use: Yes     Comment: Social drinker    Drug use: No    Sexual activity: Yes     Partners: Male   Lifestyle    Physical activity:     Days per week: None     Minutes per session: None    Stress: None   Relationships    Social connections:     Talks on phone: None     Gets together: None     Attends [employer] service: None     Active member of club or organization: None     Attends meetings of clubs or organizations: None     Relationship status: None    Intimate partner violence:     Fear of current or ex partner: None     Emotionally abused: None     Physically abused: None     Forced sexual activity: None   Other Topics Concern    None   Social History Narrative    Caffeine use    Uses safety equipment - seatbelts        Medications  Current Outpatient Medications on File Prior to Visit   Medication Sig Dispense Refill    Glucosamine-Chondroit-Vit C-Mn (TH GLUCOSAMINE-CHONDROITIN) CAPS Take by mouth      levothyroxine 100 mcg tablet Take 1 tablet (100 mcg total) by mouth daily 90 tablet 3    losartan (COZAAR) 100 MG tablet TAKE 1 TABLET ONCE DAILY 90 tablet 0    MULTIPLE VITAMINS PO Take by mouth      Omega-3 Fatty Acids (FISH OIL) 1,000 mg Take by mouth      pravastatin (PRAVACHOL) 40 mg tablet TAKE 1 TABLET DAILY 90 tablet 0    Red Yeast Rice 600 MG TABS Take by mouth      aspirin (ADULT ASPIRIN EC LOW STRENGTH) 81 mg EC tablet Take by mouth       No current facility-administered medications on file prior to visit  Allergies  No Known Allergies    Review of Systems   Constitutional: Negative for chills and fever  HENT: Negative for trouble swallowing and voice change  Eyes: Negative for pain and visual disturbance  Respiratory: Negative for cough and shortness of breath  Cardiovascular: Negative for chest pain and leg swelling     Gastrointestinal: Negative for abdominal pain, nausea and vomiting  Endocrine: Negative for cold intolerance, heat intolerance, polydipsia, polyphagia and polyuria  Genitourinary: Negative for difficulty urinating and flank pain  Musculoskeletal: Negative for arthralgias and gait problem  Skin: Negative for rash and wound  Allergic/Immunologic: Negative for food allergies  Neurological: Negative for seizures, syncope, weakness and headaches  Hematological: Negative for adenopathy  Psychiatric/Behavioral: Negative for confusion  All other systems reviewed and are negative  Objective  Vitals:    10/09/19 1507   BP: 148/88   Pulse: 68   Temp: 97 6 °F (36 4 °C)       Physical Exam   Constitutional: She is oriented to person, place, and time  She appears well-developed and well-nourished  No distress  HENT:   Head: Normocephalic and atraumatic  Right Ear: External ear normal    Left Ear: External ear normal    Eyes: Conjunctivae are normal  No scleral icterus  Neck: Neck supple  No tracheal deviation present  No thyromegaly present  Well-healed low anterior neck incision  No nodularity no thyroid palpable in the neck   Cardiovascular: Normal rate, regular rhythm and normal heart sounds  Exam reveals no friction rub  No murmur heard  Pulmonary/Chest: Effort normal and breath sounds normal  No respiratory distress  She has no wheezes  She has no rales  Abdominal: Soft  She exhibits no distension and no mass  There is no tenderness  There is no rebound and no guarding  Musculoskeletal: Normal range of motion  She exhibits no edema  Lymphadenopathy:     She has no cervical adenopathy  Neurological: She is alert and oriented to person, place, and time  Skin: Skin is warm and dry  She is not diaphoretic  Psychiatric: She has a normal mood and affect  Her behavior is normal  Judgment and thought content normal    Nursing note and vitals reviewed

## 2019-10-11 ENCOUNTER — APPOINTMENT (OUTPATIENT)
Dept: LAB | Facility: CLINIC | Age: 69
End: 2019-10-11
Payer: MEDICARE

## 2019-10-11 DIAGNOSIS — Z85.850 HISTORY OF THYROID CANCER: ICD-10-CM

## 2019-10-11 LAB
T3FREE SERPL-MCNC: 3.29 PG/ML (ref 2.3–4.2)
T4 FREE SERPL-MCNC: 1.31 NG/DL (ref 0.76–1.46)
TSH SERPL DL<=0.05 MIU/L-ACNC: <0.007 UIU/ML (ref 0.36–3.74)

## 2019-10-11 PROCEDURE — 84432 ASSAY OF THYROGLOBULIN: CPT

## 2019-10-11 PROCEDURE — 84481 FREE ASSAY (FT-3): CPT | Performed by: SURGERY

## 2019-10-11 PROCEDURE — 86800 THYROGLOBULIN ANTIBODY: CPT

## 2019-10-11 PROCEDURE — 36415 COLL VENOUS BLD VENIPUNCTURE: CPT | Performed by: SURGERY

## 2019-10-11 PROCEDURE — 86376 MICROSOMAL ANTIBODY EACH: CPT

## 2019-10-11 PROCEDURE — 84439 ASSAY OF FREE THYROXINE: CPT

## 2019-10-11 PROCEDURE — 84443 ASSAY THYROID STIM HORMONE: CPT | Performed by: SURGERY

## 2019-10-12 LAB
THYROGLOB AB SERPL-ACNC: <1 IU/ML (ref 0–0.9)
THYROGLOB AB SERPL-ACNC: <1 IU/ML (ref 0–0.9)
THYROGLOB SERPL-MCNC: <0.1 NG/ML (ref 1.5–38.5)
THYROPEROXIDASE AB SERPL-ACNC: 8 IU/ML (ref 0–34)

## 2019-10-21 RX ORDER — AMOXICILLIN 500 MG/1
500 CAPSULE ORAL 3 TIMES DAILY
Refills: 0 | COMMUNITY
Start: 2019-08-28 | End: 2020-07-09

## 2019-10-21 RX ORDER — AMOXICILLIN 500 MG/1
TABLET, FILM COATED ORAL
Refills: 0 | COMMUNITY
Start: 2019-10-04 | End: 2020-07-09

## 2019-10-24 ENCOUNTER — OFFICE VISIT (OUTPATIENT)
Dept: FAMILY MEDICINE CLINIC | Facility: CLINIC | Age: 69
End: 2019-10-24
Payer: MEDICARE

## 2019-10-24 VITALS
WEIGHT: 179.2 LBS | HEART RATE: 90 BPM | DIASTOLIC BLOOD PRESSURE: 80 MMHG | SYSTOLIC BLOOD PRESSURE: 140 MMHG | OXYGEN SATURATION: 98 % | BODY MASS INDEX: 32.78 KG/M2 | TEMPERATURE: 98.5 F

## 2019-10-24 DIAGNOSIS — I10 BENIGN ESSENTIAL HYPERTENSION: ICD-10-CM

## 2019-10-24 DIAGNOSIS — I10 ESSENTIAL HYPERTENSION: ICD-10-CM

## 2019-10-24 DIAGNOSIS — C73 CARCINOMA OF THYROID (HCC): ICD-10-CM

## 2019-10-24 DIAGNOSIS — Z12.11 SCREENING FOR COLON CANCER: Primary | ICD-10-CM

## 2019-10-24 DIAGNOSIS — E78.5 HYPERLIPIDEMIA, UNSPECIFIED HYPERLIPIDEMIA TYPE: ICD-10-CM

## 2019-10-24 PROCEDURE — G0439 PPPS, SUBSEQ VISIT: HCPCS | Performed by: FAMILY MEDICINE

## 2019-10-24 PROCEDURE — 99213 OFFICE O/P EST LOW 20 MIN: CPT | Performed by: FAMILY MEDICINE

## 2019-10-24 RX ORDER — LOSARTAN POTASSIUM 100 MG/1
100 TABLET ORAL DAILY
Qty: 90 TABLET | Refills: 3 | Status: SHIPPED | OUTPATIENT
Start: 2019-10-24 | End: 2020-10-01 | Stop reason: SDUPTHER

## 2019-10-24 RX ORDER — PRAVASTATIN SODIUM 40 MG
40 TABLET ORAL DAILY
Qty: 90 TABLET | Refills: 3 | Status: SHIPPED | OUTPATIENT
Start: 2019-10-24 | End: 2020-10-01 | Stop reason: SDUPTHER

## 2019-10-24 NOTE — PROGRESS NOTES
FAMILY PRACTICE OFFICE VISIT       NAME: Radha Conklin  AGE: 71 y o  SEX: female       : 1950        MRN: [id number]    DATE: 10/24/2019  TIME: 1:29 PM    Assessment and Plan     Problem List Items Addressed This Visit        Endocrine    Carcinoma of thyroid [facility])       Cardiovascular and Mediastinum    Benign essential hypertension     Hypertension  Patient blood pressure is stable at this time she will continue with current regimen of medications  She will obtain blood work as ordered         Relevant Medications    losartan (COZAAR) 100 MG tablet       Other    Hyperlipidemia     Hyperlipidemia  Patient will check lipid panel blood work and continue with current dose of statin therapy         Relevant Medications    pravastatin (PRAVACHOL) 40 mg tablet    Other Relevant Orders    CBC    Comprehensive metabolic panel    Lipid panel      Other Visit Diagnoses     Screening for colon cancer    -  Primary    Relevant Orders    Cologuard    Essential hypertension        Relevant Medications    losartan (COZAAR) 100 MG tablet    Other Relevant Orders    CBC    Comprehensive metabolic panel    Lipid panel              Chief Complaint     Chief Complaint   Patient presents with    Follow-up    Medication Refill    Medicare Wellness Visit       History of Present Illness     Patient denies any recent illness  She does see her general surgeon for monitoring of her prior history of thyroid cancer  She also sees her gynecologist for Pap smears and mammograms  Review of Systems   Review of Systems   Constitutional: Negative  HENT: Negative  Respiratory: Negative  Cardiovascular: Negative  Gastrointestinal: Negative  Genitourinary: Negative  Musculoskeletal: Negative  Neurological: Negative  Psychiatric/Behavioral: Negative          Active Problem List     Patient Active Problem List   Diagnosis    Benign essential hypertension    Carcinoma of thyroid ([address] )    Hyperlipidemia  Subclinical hyperthyroidism    History of thyroid cancer       Past Medical History:  Past Medical History:   Diagnosis Date    Carcinoma of [address] [address] )     Last Assessed:  8/29/17       Past Surgical History:  Past Surgical History:   Procedure Laterality Date    DIAGNOSTIC LAPAROSCOPY      EXPLORATORY LAPAROTOMY      SALPINGOOPHORECTOMY Left     TOTAL THYROIDECTOMY      TUBAL LIGATION         Family History:  Family History   Problem Relation Age of Onset    Colon cancer Mother     Heart disease Father     Other Father         Stroke syndrome    Colon cancer Brother     Other Son         Brain tumor       Social History:  Social History     Socioeconomic History    Marital status: /Civil Union     Spouse name: Not on file    Number of children: Not on file    Years of education: Not on file    Highest education level: Not on file   Occupational History    Occupation: retired   Social Needs    Financial resource strain: Not on file    Food insecurity:     Worry: Not on file     Inability: Not on file   [employer] needs:     Medical: Not on file     Non-medical: Not on file   Tobacco Use    Smoking status: Never Smoker    Smokeless tobacco: Never Used   Substance and Sexual Activity    Alcohol use: Yes     Comment: Social drinker    Drug use: No    Sexual activity: Yes     Partners: Male   Lifestyle    Physical activity:     Days per week: Not on file     Minutes per session: Not on file    Stress: Not on file   Relationships    Social connections:     Talks on phone: Not on file     Gets together: Not on file     Attends Sabianism service: Not on file     Active member of club or organization: Not on file     Attends meetings of clubs or organizations: Not on file     Relationship status: Not on file    Intimate partner violence:     Fear of current or ex partner: Not on file     Emotionally abused: Not on file     Physically abused: Not on file     Forced sexual activity: Not on file   Other Topics Concern    Not on file   Social History Narrative    Caffeine use    Uses safety equipment - seatbelts       Objective     Vitals:    10/24/19 1314   BP: 140/80   Pulse:    Temp:    SpO2:      Wt Readings from Last 3 Encounters:   10/24/19 81 3 kg (179 lb 3 2 oz)   10/09/19 79 7 kg (175 lb 12 8 oz)   07/01/19 81 2 kg (179 lb)       Physical Exam   Constitutional: She is oriented to person, place, and time  No distress  HENT:   Right Ear: External ear normal    Left Ear: External ear normal    Mouth/Throat: Oropharynx is clear and moist  No oropharyngeal exudate  Cardiovascular: Normal rate, regular rhythm and normal heart sounds  No murmur heard  Pulmonary/Chest: Effort normal and breath sounds normal  No respiratory distress  She has no wheezes  She has no rales  Musculoskeletal: She exhibits no edema  Lymphadenopathy:     She has no cervical adenopathy  Neurological: She is alert and oriented to person, place, and time  No cranial nerve deficit  Psychiatric: She has a normal mood and affect   Her behavior is normal  Judgment and thought content normal        Pertinent Laboratory/Diagnostic Studies:  Lab Results   Component Value Date    GLUCOSE 94 08/13/2015    BUN 17 07/26/2018    CREATININE 0 71 07/26/2018    CALCIUM 8 5 07/26/2018     08/13/2015    K 4 3 07/26/2018    CO2 26 07/26/2018     07/26/2018     Lab Results   Component Value Date    ALT 31 07/26/2018    AST 17 07/26/2018    ALKPHOS 101 07/26/2018    BILITOT 0 53 08/13/2015       Lab Results   Component Value Date    WBC 7 27 07/26/2018    HGB 12 7 07/26/2018    HCT 39 8 07/26/2018    MCV 92 07/26/2018     07/26/2018       No results found for: TSH    Lab Results   Component Value Date    CHOL 200 08/13/2015     Lab Results   Component Value Date    TRIG 120 07/26/2018     Lab Results   Component Value Date    HDL 49 07/26/2018     Lab Results   Component Value Date    LDLCALC 102 (H) 07/26/2018     No results found for: HGBA1C    Results for orders placed or performed in visit on 10/11/19   T4, free   Result Value Ref Range    Free T4 1 31 0 76 - 1 46 ng/dL   Thyroglobulin   Result Value Ref Range    Thyroglobulin Ab <1 0 0 0 - 0 9 IU/mL   Anti-microsomal antibody   Result Value Ref Range    THYROID MICROSOMAL ANTIBODY 8 0 - 34 IU/mL   Anti-thyroglobulin antibody   Result Value Ref Range    Thyroglobulin Ab <1 0 0 0 - 0 9 IU/mL   Thyroglobulin by ARMAAN   Result Value Ref Range    Thyroglobulin-ARMAAN <0 1 (L) 1 5 - 38 5 ng/mL       Orders Placed This Encounter   Procedures    Cologuard    CBC    Comprehensive metabolic panel    Lipid panel       ALLERGIES:  No Known Allergies    Current Medications     Current Outpatient Medications   Medication Sig Dispense Refill    amoxicillin (AMOXIL) 500 mg capsule Take 500 mg by mouth 3 (three) times a day  0    amoxicillin (AMOXIL) 500 MG tablet take 4 tablets by mouth 1 hour prior to appointment  0    Glucosamine-Chondroit-Vit C-Mn (TH GLUCOSAMINE-CHONDROITIN) CAPS Take by mouth      levothyroxine 100 mcg tablet Take 1 tablet (100 mcg total) by mouth daily 90 tablet 3    losartan (COZAAR) 100 MG tablet Take 1 tablet (100 mg total) by mouth daily 90 tablet 3    MULTIPLE VITAMINS PO Take by mouth      Omega-3 Fatty Acids (FISH OIL) 1,000 mg Take by mouth      pravastatin (PRAVACHOL) 40 mg tablet Take 1 tablet (40 mg total) by mouth daily 90 tablet 3    Red Yeast Rice 600 MG TABS Take by mouth      aspirin (ADULT ASPIRIN EC LOW STRENGTH) 81 mg EC tablet Take by mouth       No current facility-administered medications for this visit            Health Maintenance     Health Maintenance   Topic Date Due    Hepatitis C Screening  1950   [facility] Medicare Annual Wellness Visit (AWV)  1950    SLP PLAN OF CARE  1950    BMI: Followup Plan  09/01/1968    DTaP,Tdap,and Td Vaccines (1 - Tdap) 09/01/1971    CRC Screening: Marcus 09/01/2000    Fall Risk  09/01/2015    Urinary Incontinence Screening  09/01/2015    INFLUENZA VACCINE  07/01/2019    Depression Screening PHQ  07/30/2019    BMI: Adult  10/09/2020    MAMMOGRAM  06/10/2021    Pneumococcal Vaccine: 65+ Years  Completed    Pneumococcal Vaccine: Pediatrics (0 to 5 Years) and At-Risk Patients (6 to 59 Years)  Aged Out    HEPATITIS B VACCINES  Aged [employer] History   Administered Date(s) Administered    INFLUENZA 10/26/2018    Pneumococcal Conjugate 13-Valent 04/12/2017    Pneumococcal Polysaccharide PPV23 05/29/2018    Zoster 61/17/5743       Glo Walker MD

## 2019-10-24 NOTE — PROGRESS NOTES
Assessment and Plan:     Problem List Items Addressed This Visit        Endocrine    Carcinoma of thyroid [facility])       Cardiovascular and Mediastinum    Benign essential hypertension     Hypertension  Patient blood pressure is stable at this time she will continue with current regimen of medications  She will obtain blood work as ordered         Relevant Medications    losartan (COZAAR) 100 MG tablet       Other    Hyperlipidemia     Hyperlipidemia  Patient will check lipid panel blood work and continue with current dose of statin therapy         Relevant Medications    pravastatin (PRAVACHOL) 40 mg tablet    Other Relevant Orders    CBC    Comprehensive metabolic panel    Lipid panel      Other Visit Diagnoses     Screening for colon cancer    -  Primary    Relevant Orders    Cologuard    Essential hypertension        Relevant Medications    losartan (COZAAR) 100 MG tablet    Other Relevant Orders    CBC    Comprehensive metabolic panel    Lipid panel           Preventive health issues were discussed with patient, and age appropriate screening tests were ordered as noted in patient's After Visit Summary  Personalized health advice and appropriate referrals for health education or preventive services given if needed, as noted in patient's After Visit Summary       History of Present Illness:     Patient presents for Medicare Annual Wellness visit    Patient Care Team:  Sybil Lesch, MD as PCP - General  Sybil Lesch, MD     Problem List:     Patient Active Problem List   Diagnosis    Benign essential hypertension    Carcinoma [address] ([facility] [address]    Hyperlipidemia    Subclinical hyperthyroidism    History of thyroid cancer      Past Medical and Surgical History:     Past Medical History:   Diagnosis Date    Carcinoma of thyroid ([facility] [address] )     Last Assessed:  8/29/17     Past Surgical History:   Procedure Laterality Date    DIAGNOSTIC LAPAROSCOPY      EXPLORATORY LAPAROTOMY      SALPINGOOPHORECTOMY Left     TOTAL THYROIDECTOMY      TUBAL LIGATION        Family History:     Family History   Problem Relation Age of Onset    Colon cancer Mother     Heart disease Father     Other Father         Stroke syndrome    Colon cancer Brother     Other Son         Brain tumor      Social History:     Social History     Socioeconomic History    Marital status: /Civil Union     Spouse name: Not on file    Number of children: Not on file    Years of education: Not on file    Highest education level: Not on file   Occupational History    Occupation: retired   Social Needs    Financial resource strain: Not on file    Food insecurity:     Worry: Not on file     Inability: Not on file   [employer] needs:     Medical: Not on file     Non-medical: Not on file   Tobacco Use    Smoking status: Never Smoker    Smokeless tobacco: Never Used   Substance and Sexual Activity    Alcohol use: Yes     Comment: Social drinker    Drug use: No    Sexual activity: Yes     Partners: Male   Lifestyle    Physical activity:     Days per week: Not on file     Minutes per session: Not on file    Stress: Not on file   Relationships    Social connections:     Talks on phone: Not on file     Gets together: Not on file     Attends Congregational service: Not on file     Active member of club or organization: Not on file     Attends meetings of clubs or organizations: Not on file     Relationship status: Not on file    Intimate partner violence:     Fear of current or ex partner: Not on file     Emotionally abused: Not on file     Physically abused: Not on file     Forced sexual activity: Not on file   Other Topics Concern    Not on file   Social History Narrative    Caffeine use    Uses safety equipment - seatbelts       Medications and Allergies:     Current Outpatient Medications   Medication Sig Dispense Refill    amoxicillin (AMOXIL) 500 mg capsule Take 500 mg by mouth 3 (three) times a day  0    amoxicillin (AMOXIL) 500 MG tablet take 4 tablets by mouth 1 hour prior to appointment  0    Glucosamine-Chondroit-Vit C-Mn (TH GLUCOSAMINE-CHONDROITIN) CAPS Take by mouth      levothyroxine 100 mcg tablet Take 1 tablet (100 mcg total) by mouth daily 90 tablet 3    losartan (COZAAR) 100 MG tablet Take 1 tablet (100 mg total) by mouth daily 90 tablet 3    MULTIPLE VITAMINS PO Take by mouth      Omega-3 Fatty Acids (FISH OIL) 1,000 mg Take by mouth      pravastatin (PRAVACHOL) 40 mg tablet Take 1 tablet (40 mg total) by mouth daily 90 tablet 3    Red Yeast Rice 600 MG TABS Take by mouth      aspirin (ADULT ASPIRIN EC LOW STRENGTH) 81 mg EC tablet Take by mouth       No current facility-administered medications for this visit  No Known Allergies   Immunizations:     Immunization History   Administered Date(s) Administered    INFLUENZA 10/26/2018    Pneumococcal Conjugate 13-Valent 04/12/2017    Pneumococcal Polysaccharide PPV23 05/29/2018    Zoster 10/31/2016      Health Maintenance:         Topic Date Due    Hepatitis C Screening  1950    CRC Screening: Cologuard  09/01/2000    MAMMOGRAM  06/10/2021         Topic Date Due    DTaP,Tdap,and Td Vaccines (1 - Tdap) 09/01/1971    INFLUENZA VACCINE  07/01/2019      Medicare Health Risk Assessment:     /80   Pulse 90   Temp 98 5 °F (36 9 °C)   Wt 81 3 kg (179 lb 3 2 oz)   SpO2 98%   BMI 32 78 kg/m²      Amanda Morgan is here for her Initial Wellness visit  Health Risk Assessment:   Patient rates overall health as excellent  Patient feels that their physical health rating is same  Eyesight was rated as same  Hearing was rated as same  Patient feels that their emotional and mental health rating is same  Pain experienced in the last 7 days has been none  Depression Screening:   PHQ-2 Score: 0      Fall Risk Screening:    In the past year, patient has experienced: no history of falling in past year      Urinary Incontinence Screening:   Patient has not leaked urine accidently in the last six months  Home Safety:  Patient does not have trouble with stairs inside or outside of their home  Patient has working smoke alarms and has working carbon monoxide detector  Home safety hazards include: none  Nutrition:   Current diet is Low Cholesterol, Low Saturated Fat, Limited junk food and No Added Salt  Medications:   Patient is currently taking over-the-counter supplements  OTC medications include: see medication list  Patient is able to manage medications  Activities of Daily Living (ADLs)/Instrumental Activities of Daily Living (IADLs):   Walk and transfer into and out of bed and chair?: Yes  Dress and groom yourself?: Yes    Bathe or shower yourself?: Yes    Feed yourself?  Yes  Do your laundry/housekeeping?: Yes  Manage your money, pay your bills and track your expenses?: Yes  Make your own meals?: Yes    Do your own shopping?: Yes    Previous Hospitalizations:   Any hospitalizations or ED visits within the last 12 months?: No      Advance Care Planning:   Living will: No    Durable POA for healthcare: No    Advanced directive: No      Cognitive Screening:   Provider or family/friend/caregiver concerned regarding cognition?: No    PREVENTIVE SCREENINGS      Cardiovascular Screening:    General: Screening Not Indicated, History Lipid Disorder and Risks and Benefits Discussed    Due for: Lipid Panel      Diabetes Screening:     General: Risks and Benefits Discussed    Due for: Blood Glucose      Colorectal Cancer Screening:     General: Screening Not Indicated    Due for: Cologuard      Breast Cancer Screening:     General: Screening Current      Cervical Cancer Screening:    General: Screening Not Indicated      Preventive Screening Comments: Patient to receive annual flu vaccine tomorrow at local pharmacy      Lazaro Adler MD

## 2019-10-24 NOTE — PATIENT INSTRUCTIONS
Medicare Preventive Visit Patient Instructions  Thank you for completing your Welcome to Medicare Visit or Medicare Annual Wellness Visit today  Your next wellness visit will be due in one year (10/24/2020)  The screening/preventive services that you may require over the next 5-10 years are detailed below  Some tests may not apply to you based off risk factors and/or age  Screening tests ordered at today's visit but not completed yet may show as past due  Also, please note that scanned in results may not display below  Preventive Screenings:  Service Recommendations Previous Testing/Comments   Colorectal Cancer Screening  * Colonoscopy    * Fecal Occult Blood Test (FOBT)/Fecal Immunochemical Test (FIT)  * Fecal DNA/Cologuard Test  * Flexible Sigmoidoscopy Age: 54-65 years old   Colonoscopy: every 10 years (may be performed more frequently if at higher risk)  OR  FOBT/FIT: every 1 year  OR  Cologuard: every 3 years  OR  Sigmoidoscopy: every 5 years  Screening may be recommended earlier than age 48 if at higher risk for colorectal cancer  Also, an individualized decision between you and your healthcare provider will decide whether screening between the ages of 74-80 would be appropriate  Colonoscopy: Not on file  FOBT/FIT: Not on file  Cologuard: Not on file  Sigmoidoscopy: Not on file         Breast Cancer Screening Age: 36 years old  Frequency: every 1-2 years  Not required if history of left and right mastectomy Mammogram: 06/10/2019    Screening Current   Cervical Cancer Screening Between the ages of 21-29, pap smear recommended once every 3 years  Between the ages of 33-67, can perform pap smear with HPV co-testing every 5 years     Recommendations may differ for women with a history of total hysterectomy, cervical cancer, or abnormal pap smears in past  Pap Smear: 07/01/2019    Screening Not Indicated   Hepatitis C Screening Once for adults born between 1945 and 1965  More frequently in patients at high risk for Hepatitis C Hep C Antibody: Not on file       Diabetes Screening 1-2 times per year if you're at risk for diabetes or have pre-diabetes Fasting glucose: 89 mg/dL   A1C: No results in last 5 years       Cholesterol Screening Once every 5 years if you don't have a lipid disorder  May order more often based on risk factors  Lipid panel: 07/26/2018    Screening Not Indicated  History Lipid Disorder     Other Preventive Screenings Covered by Medicare:  1  Abdominal Aortic Aneurysm (AAA) Screening: covered once if your at risk  You're considered to be at risk if you have a family history of AAA  2  Lung Cancer Screening: covers low dose CT scan once per year if you meet all of the following conditions: (1) Age 50-69; (2) No signs or symptoms of lung cancer; (3) Current smoker or have quit smoking within the last 15 years; (4) You have a tobacco smoking history of at least 30 pack years (packs per day multiplied by number of years you smoked); (5) You get a written order from a healthcare provider  3  Glaucoma Screening: covered annually if you're considered high risk: (1) You have diabetes OR (2) Family history of glaucoma OR (3)  aged 48 and older OR (3)  American aged 72 and older  3  Osteoporosis Screening: covered every 2 years if you meet one of the following conditions: (1) You're estrogen deficient and at risk for osteoporosis based off medical history and other findings; (2) Have a vertebral abnormality; (3) On glucocorticoid therapy for more than 3 months; (4) Have primary hyperparathyroidism; (5) On osteoporosis medications and need to assess response to drug therapy  · Last bone density test (DXA Scan): Not on file  5  HIV Screening: covered annually if you're between the age of 12-76  Also covered annually if you are younger than 13 and older than 72 with risk factors for HIV infection   For pregnant patients, it is covered up to 3 times per pregnancy  Immunizations:  Immunization Recommendations   Influenza Vaccine Annual influenza vaccination during flu season is recommended for all persons aged >= 6 months who do not have contraindications   Pneumococcal Vaccine (Prevnar and Pneumovax)  * Prevnar = PCV13  * Pneumovax = PPSV23   Adults 25-60 years old: 1-3 doses may be recommended based on certain risk factors  Adults 72 years old: Prevnar (PCV13) vaccine recommended followed by Pneumovax (PPSV23) vaccine  If already received PPSV23 since turning 65, then PCV13 recommended at least one year after PPSV23 dose  Hepatitis B Vaccine 3 dose series if at intermediate or high risk (ex: diabetes, end stage renal disease, liver disease)   Tetanus (Td) Vaccine - COST NOT COVERED BY MEDICARE PART B Following completion of primary series, a booster dose should be given every 10 years to maintain immunity against tetanus  Td may also be given as tetanus wound prophylaxis  Tdap Vaccine - COST NOT COVERED BY MEDICARE PART B Recommended at least once for all adults  For pregnant patients, recommended with each pregnancy  Shingles Vaccine (Shingrix) - COST NOT COVERED BY MEDICARE PART B  2 shot series recommended in those aged 48 and above     Health Maintenance Due:      Topic Date Due    Hepatitis C Screening  1950    CRC Screening: Cologuard  09/01/2000    MAMMOGRAM  06/10/2021     Immunizations Due:      Topic Date Due    DTaP,Tdap,and Td Vaccines (1 - Tdap) 09/01/1971    INFLUENZA VACCINE  07/01/2019     Advance Directives   What are advance directives? Advance directives are legal documents that state your wishes and plans for medical care  These plans are made ahead of time in case you lose your ability to make decisions for yourself  Advance directives can apply to any medical decision, such as the treatments you want, and if you want to donate organs  What are the types of advance directives?   There are many types of advance directives, and each state has rules about how to use them  You may choose a combination of any of the following:  · Living will: This is a written record of the treatment you want  You can also choose which treatments you do not want, which to limit, and which to stop at a certain time  This includes surgery, medicine, IV fluid, and tube feedings  · Durable power of  for healthcare [facility]): This is a written record that states who you want to make healthcare choices for you when you are unable to make them for yourself  This person, called a proxy, is usually a family member or a friend  You may choose more than 1 proxy  · Do not resuscitate (DNR) order:  A DNR order is used in case your heart stops beating or you stop breathing  It is a request not to have certain forms of treatment, such as CPR  A DNR order may be included in other types of advance directives  · Medical directive: This covers the care that you want if you are in a coma, near death, or unable to make decisions for yourself  You can list the treatments you want for each condition  Treatment may include pain medicine, surgery, blood transfusions, dialysis, IV or tube feedings, and a ventilator (breathing machine)  · Values history: This document has questions about your views, beliefs, and how you feel and think about life  This information can help others choose the care that you would choose  Why are advance directives important? An advance directive helps you control your care  Although spoken wishes may be used, it is better to have your wishes written down  Spoken wishes can be misunderstood, or not followed  Treatments may be given even if you do not want them  An advance directive may make it easier for your family to make difficult choices about your care     Weight Management   Why it is important to manage your weight:  Being overweight increases your risk of health conditions such as heart disease, high blood pressure, type 2 diabetes, and certain types of cancer  It can also increase your risk for osteoarthritis, sleep apnea, and other respiratory problems  Aim for a slow, steady weight loss  Even a small amount of weight loss can lower your risk of health problems  How to lose weight safely:  A safe and healthy way to lose weight is to eat fewer calories and get regular exercise  You can lose up about 1 pound a week by decreasing the number of calories you eat by 500 calories each day  Healthy meal plan for weight management:  A healthy meal plan includes a variety of foods, contains fewer calories, and helps you stay healthy  A healthy meal plan includes the following:  · Eat whole-grain foods more often  A healthy meal plan should contain fiber  Fiber is the part of grains, fruits, and vegetables that is not broken down by your body  Whole-grain foods are healthy and provide extra fiber in your diet  Some examples of whole-grain foods are whole-wheat breads and pastas, oatmeal, brown rice, and bulgur  · Eat a variety of vegetables every day  Include dark, leafy greens such as spinach, kale, jimenez greens, and mustard greens  Eat yellow and orange vegetables such as carrots, sweet potatoes, and winter squash  · Eat a variety of fruits every day  Choose fresh or canned fruit (canned in its own juice or light syrup) instead of juice  Fruit juice has very little or no fiber  · Eat low-fat dairy foods  Drink fat-free (skim) milk or 1% milk  Eat fat-free yogurt and low-fat cottage cheese  Try low-fat cheeses such as mozzarella and other reduced-fat cheeses  · Choose meat and other protein foods that are low in fat  Choose beans or other legumes such as split peas or lentils  Choose fish, skinless poultry (chicken or turkey), or lean cuts of red meat (beef or pork)  Before you cook meat or poultry, cut off any visible fat  · Use less fat and oil  Try baking foods instead of frying them   Add less fat, such as margarine, sour cream, regular salad dressing and mayonnaise to foods  Eat fewer high-fat foods  Some examples of high-fat foods include french fries, doughnuts, ice cream, and cakes  · Eat fewer sweets  Limit foods and drinks that are high in sugar  This includes candy, cookies, regular soda, and sweetened drinks  Exercise:  Exercise at least 30 minutes per day on most days of the week  Some examples of exercise include walking, biking, dancing, and swimming  You can also fit in more physical activity by taking the stairs instead of the elevator or parking farther away from stores  Ask your healthcare provider about the best exercise plan for you  © Copyright [employer] 2018 Information is for End User's use only and may not be sold, redistributed or otherwise used for commercial purposes   All illustrations and images included in CareNotes® are the copyrighted property of A D A M , Inc  or [address] [place] [address]

## 2019-10-25 ENCOUNTER — APPOINTMENT (OUTPATIENT)
Dept: LAB | Facility: CLINIC | Age: 69
End: 2019-10-25
Payer: MEDICARE

## 2019-10-25 DIAGNOSIS — E78.5 HYPERLIPIDEMIA, UNSPECIFIED HYPERLIPIDEMIA TYPE: ICD-10-CM

## 2019-10-25 DIAGNOSIS — I10 ESSENTIAL HYPERTENSION: ICD-10-CM

## 2019-10-25 LAB
ALBUMIN SERPL BCP-MCNC: 4.1 G/DL (ref 3.5–5)
ALP SERPL-CCNC: 102 U/L (ref 46–116)
ALT SERPL W P-5'-P-CCNC: 29 U/L (ref 12–78)
ANION GAP SERPL CALCULATED.3IONS-SCNC: 5 MMOL/L (ref 4–13)
AST SERPL W P-5'-P-CCNC: 17 U/L (ref 5–45)
BILIRUB SERPL-MCNC: 0.44 MG/DL (ref 0.2–1)
BUN SERPL-MCNC: 16 MG/DL (ref 5–25)
CALCIUM SERPL-MCNC: 8.5 MG/DL (ref 8.3–10.1)
CHLORIDE SERPL-SCNC: 107 MMOL/L (ref 100–108)
CHOLEST SERPL-MCNC: 195 MG/DL (ref 50–200)
CO2 SERPL-SCNC: 28 MMOL/L (ref 21–32)
CREAT SERPL-MCNC: 0.78 MG/DL (ref 0.6–1.3)
ERYTHROCYTE [DISTWIDTH] IN BLOOD BY AUTOMATED COUNT: 12.8 % (ref 11.6–15.1)
GFR SERPL CREATININE-BSD FRML MDRD: 78 ML/MIN/1.73SQ M
GLUCOSE P FAST SERPL-MCNC: 91 MG/DL (ref 65–99)
HCT VFR BLD AUTO: 39.2 % (ref 34.8–46.1)
HDLC SERPL-MCNC: 53 MG/DL
HGB BLD-MCNC: 12.4 G/DL (ref 11.5–15.4)
LDLC SERPL CALC-MCNC: 118 MG/DL (ref 0–100)
MCH RBC QN AUTO: 29 PG (ref 26.8–34.3)
MCHC RBC AUTO-ENTMCNC: 31.6 G/DL (ref 31.4–37.4)
MCV RBC AUTO: 92 FL (ref 82–98)
NONHDLC SERPL-MCNC: 142 MG/DL
PLATELET # BLD AUTO: 302 THOUSANDS/UL (ref 149–390)
PMV BLD AUTO: 10.3 FL (ref 8.9–12.7)
POTASSIUM SERPL-SCNC: 4.4 MMOL/L (ref 3.5–5.3)
PROT SERPL-MCNC: 7.4 G/DL (ref 6.4–8.2)
RBC # BLD AUTO: 4.27 MILLION/UL (ref 3.81–5.12)
SODIUM SERPL-SCNC: 140 MMOL/L (ref 136–145)
TRIGL SERPL-MCNC: 121 MG/DL
WBC # BLD AUTO: 6.11 THOUSAND/UL (ref 4.31–10.16)

## 2019-10-25 PROCEDURE — 80053 COMPREHEN METABOLIC PANEL: CPT

## 2019-10-25 PROCEDURE — 85027 COMPLETE CBC AUTOMATED: CPT

## 2019-10-25 PROCEDURE — 36415 COLL VENOUS BLD VENIPUNCTURE: CPT

## 2019-10-25 PROCEDURE — 80061 LIPID PANEL: CPT

## 2020-01-09 DIAGNOSIS — Z85.850 HISTORY OF THYROID CANCER: ICD-10-CM

## 2020-01-09 RX ORDER — LEVOTHYROXINE SODIUM 0.1 MG/1
TABLET ORAL
Qty: 90 TABLET | Refills: 0 | Status: SHIPPED | OUTPATIENT
Start: 2020-01-09 | End: 2020-03-25 | Stop reason: SDUPTHER

## 2020-03-24 ENCOUNTER — TELEPHONE (OUTPATIENT)
Dept: SURGERY | Facility: CLINIC | Age: 70
End: 2020-03-24

## 2020-03-25 DIAGNOSIS — Z85.850 HISTORY OF THYROID CANCER: ICD-10-CM

## 2020-03-25 RX ORDER — LEVOTHYROXINE SODIUM 0.1 MG/1
100 TABLET ORAL DAILY
Qty: 90 TABLET | Refills: 3 | Status: SHIPPED | OUTPATIENT
Start: 2020-03-25 | End: 2021-10-20 | Stop reason: SDUPTHER

## 2020-03-25 NOTE — TELEPHONE ENCOUNTER
Called and left a message with patient that Dr Min Hutchinson called her medication to her pharmacy

## 2020-06-16 DIAGNOSIS — Z12.31 ENCOUNTER FOR SCREENING MAMMOGRAM FOR MALIGNANT NEOPLASM OF BREAST: ICD-10-CM

## 2020-07-09 ENCOUNTER — ANNUAL EXAM (OUTPATIENT)
Dept: OBGYN CLINIC | Facility: CLINIC | Age: 70
End: 2020-07-09
Payer: MEDICARE

## 2020-07-09 VITALS
DIASTOLIC BLOOD PRESSURE: 94 MMHG | SYSTOLIC BLOOD PRESSURE: 152 MMHG | BODY MASS INDEX: 33.64 KG/M2 | WEIGHT: 182.8 LBS | TEMPERATURE: 98.5 F | HEIGHT: 62 IN

## 2020-07-09 DIAGNOSIS — Z12.31 ENCOUNTER FOR SCREENING MAMMOGRAM FOR MALIGNANT NEOPLASM OF BREAST: ICD-10-CM

## 2020-07-09 DIAGNOSIS — Z01.419 ENCOUNTER FOR GYNECOLOGICAL EXAMINATION (GENERAL) (ROUTINE) WITHOUT ABNORMAL FINDINGS: Primary | ICD-10-CM

## 2020-07-09 DIAGNOSIS — C73 CARCINOMA OF THYROID (HCC): ICD-10-CM

## 2020-07-09 DIAGNOSIS — Z13.820 SCREENING FOR OSTEOPOROSIS: ICD-10-CM

## 2020-07-09 DIAGNOSIS — Z12.11 SCREENING FOR COLON CANCER: ICD-10-CM

## 2020-07-09 DIAGNOSIS — Z78.0 POST-MENOPAUSE: ICD-10-CM

## 2020-07-09 PROCEDURE — G0101 CA SCREEN;PELVIC/BREAST EXAM: HCPCS | Performed by: STUDENT IN AN ORGANIZED HEALTH CARE EDUCATION/TRAINING PROGRAM

## 2020-09-16 DIAGNOSIS — I10 ESSENTIAL HYPERTENSION: ICD-10-CM

## 2020-09-16 DIAGNOSIS — E78.5 HYPERLIPIDEMIA, UNSPECIFIED HYPERLIPIDEMIA TYPE: ICD-10-CM

## 2020-09-16 RX ORDER — LOSARTAN POTASSIUM 100 MG/1
TABLET ORAL
Qty: 90 TABLET | Refills: 3 | OUTPATIENT
Start: 2020-09-16

## 2020-09-16 RX ORDER — PRAVASTATIN SODIUM 40 MG
TABLET ORAL
Qty: 90 TABLET | Refills: 3 | OUTPATIENT
Start: 2020-09-16

## 2020-10-01 ENCOUNTER — OFFICE VISIT (OUTPATIENT)
Dept: FAMILY MEDICINE CLINIC | Facility: CLINIC | Age: 70
End: 2020-10-01
Payer: MEDICARE

## 2020-10-01 VITALS
RESPIRATION RATE: 16 BRPM | BODY MASS INDEX: 33.97 KG/M2 | TEMPERATURE: 97.6 F | DIASTOLIC BLOOD PRESSURE: 91 MMHG | OXYGEN SATURATION: 96 % | SYSTOLIC BLOOD PRESSURE: 150 MMHG | HEART RATE: 101 BPM | HEIGHT: 62 IN | WEIGHT: 184.6 LBS

## 2020-10-01 DIAGNOSIS — I10 BENIGN ESSENTIAL HYPERTENSION: ICD-10-CM

## 2020-10-01 DIAGNOSIS — Z23 NEED FOR INFLUENZA VACCINATION: Primary | ICD-10-CM

## 2020-10-01 DIAGNOSIS — C73 CARCINOMA OF THYROID (HCC): ICD-10-CM

## 2020-10-01 DIAGNOSIS — E78.5 HYPERLIPIDEMIA, UNSPECIFIED HYPERLIPIDEMIA TYPE: ICD-10-CM

## 2020-10-01 DIAGNOSIS — I10 ESSENTIAL HYPERTENSION: ICD-10-CM

## 2020-10-01 DIAGNOSIS — Z12.11 COLON CANCER SCREENING: ICD-10-CM

## 2020-10-01 PROCEDURE — 99213 OFFICE O/P EST LOW 20 MIN: CPT | Performed by: FAMILY MEDICINE

## 2020-10-01 PROCEDURE — 90662 IIV NO PRSV INCREASED AG IM: CPT | Performed by: FAMILY MEDICINE

## 2020-10-01 PROCEDURE — G0008 ADMIN INFLUENZA VIRUS VAC: HCPCS | Performed by: FAMILY MEDICINE

## 2020-10-01 PROCEDURE — G0439 PPPS, SUBSEQ VISIT: HCPCS | Performed by: FAMILY MEDICINE

## 2020-10-01 RX ORDER — LOSARTAN POTASSIUM 100 MG/1
100 TABLET ORAL DAILY
Qty: 90 TABLET | Refills: 3 | Status: SHIPPED | OUTPATIENT
Start: 2020-10-01 | End: 2021-10-04

## 2020-10-01 RX ORDER — PRAVASTATIN SODIUM 40 MG
40 TABLET ORAL DAILY
Qty: 90 TABLET | Refills: 3 | Status: SHIPPED | OUTPATIENT
Start: 2020-10-01 | End: 2021-10-04

## 2020-10-07 DIAGNOSIS — Z12.11 COLON CANCER SCREENING: ICD-10-CM

## 2020-10-15 ENCOUNTER — TELEPHONE (OUTPATIENT)
Dept: FAMILY MEDICINE CLINIC | Facility: CLINIC | Age: 70
End: 2020-10-15

## 2020-10-20 ENCOUNTER — OFFICE VISIT (OUTPATIENT)
Dept: SURGERY | Facility: CLINIC | Age: 70
End: 2020-10-20
Payer: MEDICARE

## 2020-10-20 VITALS — TEMPERATURE: 98.3 F | BODY MASS INDEX: 34.16 KG/M2 | WEIGHT: 185.6 LBS | HEIGHT: 62 IN

## 2020-10-20 DIAGNOSIS — Z85.850 HISTORY OF THYROID CANCER: Primary | ICD-10-CM

## 2020-10-20 PROCEDURE — 99213 OFFICE O/P EST LOW 20 MIN: CPT | Performed by: SURGERY

## 2020-10-22 ENCOUNTER — LAB (OUTPATIENT)
Dept: LAB | Facility: CLINIC | Age: 70
End: 2020-10-22
Payer: MEDICARE

## 2020-10-22 DIAGNOSIS — Z85.850 HISTORY OF THYROID CANCER: ICD-10-CM

## 2020-10-22 DIAGNOSIS — E78.5 HYPERLIPIDEMIA, UNSPECIFIED HYPERLIPIDEMIA TYPE: ICD-10-CM

## 2020-10-22 DIAGNOSIS — I10 ESSENTIAL HYPERTENSION: ICD-10-CM

## 2020-10-22 LAB
ALBUMIN SERPL BCP-MCNC: 3.8 G/DL (ref 3.5–5)
ALP SERPL-CCNC: 95 U/L (ref 46–116)
ALT SERPL W P-5'-P-CCNC: 36 U/L (ref 12–78)
ANION GAP SERPL CALCULATED.3IONS-SCNC: 7 MMOL/L (ref 4–13)
AST SERPL W P-5'-P-CCNC: 19 U/L (ref 5–45)
BILIRUB SERPL-MCNC: 0.45 MG/DL (ref 0.2–1)
BUN SERPL-MCNC: 16 MG/DL (ref 5–25)
CALCIUM SERPL-MCNC: 8.7 MG/DL (ref 8.3–10.1)
CHLORIDE SERPL-SCNC: 107 MMOL/L (ref 100–108)
CO2 SERPL-SCNC: 25 MMOL/L (ref 21–32)
CREAT SERPL-MCNC: 0.86 MG/DL (ref 0.6–1.3)
ERYTHROCYTE [DISTWIDTH] IN BLOOD BY AUTOMATED COUNT: 12.9 % (ref 11.6–15.1)
GFR SERPL CREATININE-BSD FRML MDRD: 69 ML/MIN/1.73SQ M
GLUCOSE P FAST SERPL-MCNC: 105 MG/DL (ref 65–99)
HCT VFR BLD AUTO: 37.3 % (ref 34.8–46.1)
HGB BLD-MCNC: 12 G/DL (ref 11.5–15.4)
MCH RBC QN AUTO: 29.6 PG (ref 26.8–34.3)
MCHC RBC AUTO-ENTMCNC: 32.2 G/DL (ref 31.4–37.4)
MCV RBC AUTO: 92 FL (ref 82–98)
PLATELET # BLD AUTO: 315 THOUSANDS/UL (ref 149–390)
PMV BLD AUTO: 10.3 FL (ref 8.9–12.7)
POTASSIUM SERPL-SCNC: 3.8 MMOL/L (ref 3.5–5.3)
PROT SERPL-MCNC: 7.5 G/DL (ref 6.4–8.2)
RBC # BLD AUTO: 4.05 MILLION/UL (ref 3.81–5.12)
SODIUM SERPL-SCNC: 139 MMOL/L (ref 136–145)
T3FREE SERPL-MCNC: 3.35 PG/ML (ref 2.3–4.2)
T4 FREE SERPL-MCNC: 1.46 NG/DL (ref 0.76–1.46)
TSH SERPL DL<=0.05 MIU/L-ACNC: 0.01 UIU/ML (ref 0.36–3.74)
WBC # BLD AUTO: 6.19 THOUSAND/UL (ref 4.31–10.16)

## 2020-10-22 PROCEDURE — 84481 FREE ASSAY (FT-3): CPT | Performed by: SURGERY

## 2020-10-22 PROCEDURE — 36415 COLL VENOUS BLD VENIPUNCTURE: CPT

## 2020-10-22 PROCEDURE — 85027 COMPLETE CBC AUTOMATED: CPT

## 2020-10-22 PROCEDURE — 84443 ASSAY THYROID STIM HORMONE: CPT | Performed by: SURGERY

## 2020-10-22 PROCEDURE — 84432 ASSAY OF THYROGLOBULIN: CPT

## 2020-10-22 PROCEDURE — 84439 ASSAY OF FREE THYROXINE: CPT

## 2020-10-22 PROCEDURE — 86800 THYROGLOBULIN ANTIBODY: CPT

## 2020-10-22 PROCEDURE — 80053 COMPREHEN METABOLIC PANEL: CPT

## 2020-10-22 PROCEDURE — 86376 MICROSOMAL ANTIBODY EACH: CPT

## 2020-10-23 LAB
THYROGLOB AB SERPL-ACNC: <1 IU/ML (ref 0–0.9)
THYROGLOB AB SERPL-ACNC: <1 IU/ML (ref 0–0.9)
THYROGLOB SERPL-MCNC: <0.1 NG/ML (ref 1.5–38.5)
THYROPEROXIDASE AB SERPL-ACNC: <9 IU/ML (ref 0–34)

## 2020-12-21 ENCOUNTER — TELEMEDICINE (OUTPATIENT)
Dept: FAMILY MEDICINE CLINIC | Facility: CLINIC | Age: 70
End: 2020-12-21
Payer: MEDICARE

## 2020-12-21 VITALS — TEMPERATURE: 99.4 F

## 2020-12-21 DIAGNOSIS — B34.9 VIRAL INFECTION, UNSPECIFIED: ICD-10-CM

## 2020-12-21 DIAGNOSIS — Z03.818 ENCOUNTER FOR OBSERVATION FOR SUSPECTED EXPOSURE TO OTHER BIOLOGICAL AGENTS RULED OUT: ICD-10-CM

## 2020-12-21 PROCEDURE — U0003 INFECTIOUS AGENT DETECTION BY NUCLEIC ACID (DNA OR RNA); SEVERE ACUTE RESPIRATORY SYNDROME CORONAVIRUS 2 (SARS-COV-2) (CORONAVIRUS DISEASE [COVID-19]), AMPLIFIED PROBE TECHNIQUE, MAKING USE OF HIGH THROUGHPUT TECHNOLOGIES AS DESCRIBED BY CMS-2020-01-R: HCPCS | Performed by: NURSE PRACTITIONER

## 2020-12-21 PROCEDURE — 99441 PR PHYS/QHP TELEPHONE EVALUATION 5-10 MIN: CPT | Performed by: NURSE PRACTITIONER

## 2020-12-22 ENCOUNTER — TELEMEDICINE (OUTPATIENT)
Dept: FAMILY MEDICINE CLINIC | Facility: CLINIC | Age: 70
End: 2020-12-22
Payer: MEDICARE

## 2020-12-22 ENCOUNTER — TELEPHONE (OUTPATIENT)
Dept: FAMILY MEDICINE CLINIC | Facility: CLINIC | Age: 70
End: 2020-12-22

## 2020-12-22 DIAGNOSIS — U07.1 COVID-19 VIRUS INFECTION: Primary | ICD-10-CM

## 2020-12-22 LAB — SARS-COV-2 RNA SPEC QL NAA+PROBE: DETECTED

## 2020-12-22 PROCEDURE — 99441 PR PHYS/QHP TELEPHONE EVALUATION 5-10 MIN: CPT | Performed by: NURSE PRACTITIONER

## 2021-02-03 ENCOUNTER — IMMUNIZATIONS (OUTPATIENT)
Dept: FAMILY MEDICINE CLINIC | Facility: HOSPITAL | Age: 71
End: 2021-02-03

## 2021-02-03 DIAGNOSIS — Z23 ENCOUNTER FOR IMMUNIZATION: Primary | ICD-10-CM

## 2021-02-03 PROCEDURE — 0001A SARS-COV-2 / COVID-19 MRNA VACCINE (PFIZER-BIONTECH) 30 MCG: CPT

## 2021-02-03 PROCEDURE — 91300 SARS-COV-2 / COVID-19 MRNA VACCINE (PFIZER-BIONTECH) 30 MCG: CPT

## 2021-02-24 ENCOUNTER — IMMUNIZATIONS (OUTPATIENT)
Dept: FAMILY MEDICINE CLINIC | Facility: HOSPITAL | Age: 71
End: 2021-02-24

## 2021-02-24 DIAGNOSIS — Z23 ENCOUNTER FOR IMMUNIZATION: Primary | ICD-10-CM

## 2021-02-24 PROCEDURE — 91300 SARS-COV-2 / COVID-19 MRNA VACCINE (PFIZER-BIONTECH) 30 MCG: CPT

## 2021-02-24 PROCEDURE — 0002A SARS-COV-2 / COVID-19 MRNA VACCINE (PFIZER-BIONTECH) 30 MCG: CPT

## 2021-03-26 DIAGNOSIS — C73 CARCINOMA OF THYROID (HCC): Primary | ICD-10-CM

## 2021-03-26 RX ORDER — LEVOTHYROXINE SODIUM 0.1 MG/1
100 TABLET ORAL DAILY
Qty: 90 TABLET | Refills: 2 | Status: SHIPPED | OUTPATIENT
Start: 2021-03-26 | End: 2021-10-04 | Stop reason: ALTCHOICE

## 2021-04-02 ENCOUNTER — APPOINTMENT (OUTPATIENT)
Dept: LAB | Facility: CLINIC | Age: 71
End: 2021-04-02
Payer: MEDICARE

## 2021-04-02 DIAGNOSIS — C73 CARCINOMA OF THYROID (HCC): ICD-10-CM

## 2021-04-02 DIAGNOSIS — C73 MALIGNANT NEOPLASM OF THYROID GLAND (HCC): Primary | ICD-10-CM

## 2021-04-02 LAB
T3FREE SERPL-MCNC: 3.18 PG/ML (ref 2.3–4.2)
T4 FREE SERPL-MCNC: 1.37 NG/DL (ref 0.76–1.46)
TSH SERPL DL<=0.05 MIU/L-ACNC: <0.007 UIU/ML (ref 0.36–3.74)

## 2021-04-02 PROCEDURE — 84443 ASSAY THYROID STIM HORMONE: CPT | Performed by: SURGERY

## 2021-04-02 PROCEDURE — 86800 THYROGLOBULIN ANTIBODY: CPT

## 2021-04-02 PROCEDURE — 36415 COLL VENOUS BLD VENIPUNCTURE: CPT | Performed by: SURGERY

## 2021-04-02 PROCEDURE — 84432 ASSAY OF THYROGLOBULIN: CPT

## 2021-04-02 PROCEDURE — 84481 FREE ASSAY (FT-3): CPT | Performed by: SURGERY

## 2021-04-02 PROCEDURE — 84439 ASSAY OF FREE THYROXINE: CPT | Performed by: SURGERY

## 2021-04-03 LAB
THYROGLOB AB SERPL-ACNC: <1 IU/ML (ref 0–0.9)
THYROGLOB SERPL-MCNC: <0.1 NG/ML (ref 1.5–38.5)

## 2021-10-04 ENCOUNTER — OFFICE VISIT (OUTPATIENT)
Dept: FAMILY MEDICINE CLINIC | Facility: CLINIC | Age: 71
End: 2021-10-04
Payer: MEDICARE

## 2021-10-04 VITALS
WEIGHT: 178.25 LBS | HEIGHT: 62 IN | DIASTOLIC BLOOD PRESSURE: 84 MMHG | OXYGEN SATURATION: 96 % | SYSTOLIC BLOOD PRESSURE: 136 MMHG | TEMPERATURE: 97.6 F | HEART RATE: 100 BPM | BODY MASS INDEX: 32.8 KG/M2 | RESPIRATION RATE: 16 BRPM

## 2021-10-04 DIAGNOSIS — I10 BENIGN ESSENTIAL HYPERTENSION: ICD-10-CM

## 2021-10-04 DIAGNOSIS — C73 CARCINOMA OF THYROID (HCC): ICD-10-CM

## 2021-10-04 DIAGNOSIS — E78.5 HYPERLIPIDEMIA, UNSPECIFIED HYPERLIPIDEMIA TYPE: ICD-10-CM

## 2021-10-04 DIAGNOSIS — Z23 FLU VACCINE NEED: Primary | ICD-10-CM

## 2021-10-04 DIAGNOSIS — I10 ESSENTIAL HYPERTENSION: ICD-10-CM

## 2021-10-04 PROCEDURE — 99213 OFFICE O/P EST LOW 20 MIN: CPT | Performed by: FAMILY MEDICINE

## 2021-10-04 PROCEDURE — 90662 IIV NO PRSV INCREASED AG IM: CPT

## 2021-10-04 PROCEDURE — G0439 PPPS, SUBSEQ VISIT: HCPCS | Performed by: FAMILY MEDICINE

## 2021-10-04 PROCEDURE — G0008 ADMIN INFLUENZA VIRUS VAC: HCPCS

## 2021-10-04 PROCEDURE — 1123F ACP DISCUSS/DSCN MKR DOCD: CPT | Performed by: FAMILY MEDICINE

## 2021-10-04 RX ORDER — PRAVASTATIN SODIUM 40 MG
TABLET ORAL
Qty: 90 TABLET | Refills: 3 | Status: SHIPPED | OUTPATIENT
Start: 2021-10-04

## 2021-10-04 RX ORDER — LOSARTAN POTASSIUM 100 MG/1
TABLET ORAL
Qty: 90 TABLET | Refills: 3 | Status: SHIPPED | OUTPATIENT
Start: 2021-10-04

## 2021-10-20 ENCOUNTER — OFFICE VISIT (OUTPATIENT)
Dept: SURGERY | Facility: CLINIC | Age: 71
End: 2021-10-20
Payer: MEDICARE

## 2021-10-20 VITALS — WEIGHT: 176 LBS | TEMPERATURE: 98.6 F | BODY MASS INDEX: 32.39 KG/M2 | HEIGHT: 62 IN

## 2021-10-20 DIAGNOSIS — Z85.850 HISTORY OF THYROID CANCER: ICD-10-CM

## 2021-10-20 PROCEDURE — 99213 OFFICE O/P EST LOW 20 MIN: CPT | Performed by: SURGERY

## 2021-10-20 RX ORDER — LEVOTHYROXINE SODIUM 0.1 MG/1
100 TABLET ORAL DAILY
Qty: 90 TABLET | Refills: 3 | Status: SHIPPED | OUTPATIENT
Start: 2021-10-20

## 2021-10-30 ENCOUNTER — IMMUNIZATIONS (OUTPATIENT)
Dept: FAMILY MEDICINE CLINIC | Facility: HOSPITAL | Age: 71
End: 2021-10-30

## 2021-10-30 DIAGNOSIS — Z23 ENCOUNTER FOR IMMUNIZATION: Primary | ICD-10-CM

## 2021-10-30 PROCEDURE — 91300 COVID-19 PFIZER VACC 0.3 ML: CPT

## 2021-10-30 PROCEDURE — 0001A COVID-19 PFIZER VACC 0.3 ML: CPT

## 2022-02-25 NOTE — PROGRESS NOTES
Yariel Emmanuel   1950    CC:  Yearly exam    A:  Yearly exam without abnormality     Problem List Items Addressed This Visit        Endocrine    Carcinoma of thyroid ([facility] [address] )      Other Visit Diagnoses     Encounter for gynecological examination (general) (routine) without abnormal findings    -  Primary    Encounter for screening mammogram for malignant neoplasm of breast        Relevant Orders    Mammo screening bilateral w 3d & cad    Screening for colon cancer        Relevant Orders    Ambulatory referral to Gastroenterology    Post-menopause        Relevant Orders    DXA bone density spine hip and pelvis    Screening for osteoporosis        Relevant Orders    DXA bone density spine hip and pelvis          P:   Pap testing complete  We reviewed ASCCP guidelines for Pap testing  Mammo slip given   Colonoscopy due, referral given   DEXA due    Hot flushes discussed; discussed available therapy and pt still not interested, but will return if necessary   Thyroid function wnl at last check, managed by surgeon at this time   BP elevated today, recommend follow up with PCP    RTO one year for yearly exam or sooner as needed  S:  71 y o  female here for yearly exam  She is postmenopausal and has had no vaginal bleeding  She denies vaginal discharge, itching, odor or dryness  She denies leakage of urine, problems with bowel function  Sexual activity: She is sexually active without pain, bleeding or dryness  STD testing: She does not want STD testing today       Menopause: symptoms since 43    Last Pap: 2016 NILM, 2018 NILM and no endocervical cells  Last Mammo: 2020 B1  Last Colonoscopy: unsure when, thinks she's due  Last DEXA: unsure, does not believe that she has had performed    Non-smoker, social drinker  Walks regularly    Family hx of breast/ovarian cancer: denies  Family hx of colon cancer: mother, brother      Current Outpatient Medications:     Glucosamine-Chondroit-Vit C-Mn ( GLUCOSAMINE-CHONDROITIN) CAPS, Take by mouth, Disp: , Rfl:     levothyroxine 100 mcg tablet, Take 1 tablet (100 mcg total) by mouth daily, Disp: 90 tablet, Rfl: 3    losartan (COZAAR) 100 MG tablet, Take 1 tablet (100 mg total) by mouth daily, Disp: 90 tablet, Rfl: 3    MULTIPLE VITAMINS PO, Take by mouth, Disp: , Rfl:     Omega-3 Fatty Acids (FISH OIL) 1,000 mg, Take by mouth, Disp: , Rfl:     pravastatin (PRAVACHOL) 40 mg tablet, Take 1 tablet (40 mg total) by mouth daily, Disp: 90 tablet, Rfl: 3    Red Yeast Rice 600 MG TABS, Take by mouth, Disp: , Rfl:   Social History     Socioeconomic History    Marital status: /Civil Union     Spouse name: Not on file    Number of children: Not on file    Years of education: Not on file    Highest education level: Not on file   Occupational History    Occupation: retired   Social Needs    Financial resource strain: Not on file    Food insecurity:     Worry: Not on file     Inability: Not on file   [employer] needs:     Medical: Not on file     Non-medical: Not on file   Tobacco Use    Smoking status: Never Smoker    Smokeless tobacco: Never Used   Substance and Sexual Activity    Alcohol use: Yes     Comment: Social drinker    Drug use: No    Sexual activity: Yes     Partners: Male     Birth control/protection: Post-menopausal   Lifestyle    Physical activity:     Days per week: Not on file     Minutes per session: Not on file    Stress: Not on file   Relationships    Social connections:     Talks on phone: Not on file     Gets together: Not on file     Attends [employer] service: Not on file     Active member of club or organization: Not on file     Attends meetings of clubs or organizations: Not on file     Relationship status: Not on file    Intimate partner violence:     Fear of current or ex partner: Not on file     Emotionally abused: Not on file     Physically abused: Not on file     Forced sexual activity: Not on file   Other Topics Concern    Not on file   Social History Narrative    Caffeine use    Uses safety equipment - seatbelts     Family History   Problem Relation Age of Onset    Colon cancer Mother     Heart disease Father     Other Father         Stroke syndrome    Colon cancer Brother     Other Son         Brain tumor     Past Medical History:   Diagnosis Date    Carcinoma of thyroid ([facility] [address] )     Last Assessed:  8/29/17        Review of Systems   Respiratory: Negative  Cardiovascular: Negative  Gastrointestinal: Negative for constipation and diarrhea  Genitourinary: Negative for difficulty urinating, pelvic pain, vaginal bleeding, vaginal discharge, itching or odor  O:  Blood pressure 152/94, temperature 98 5 °F (36 9 °C), temperature source Tympanic, height 5' 2" (1 575 m), weight 82 9 kg (182 lb 12 8 oz), not currently breastfeeding  Patient appears well and is not in distress  Neck is supple without masses  S/p thyroidectomy  Breasts are symmetrical without mass, tenderness, nipple discharge, skin changes or adenopathy  Abdomen is soft and nontender without masses  Vulva atrophic, but without lesions or rashes  Urethral meatus and urethra are normal  Bladder is normal to palpation  Vagina is atrophic, but normal and without discharge or bleeding  Cervix is normal without discharge or lesion  Uterus and adnexa are normal, mobile, nontender without palpable mass    Rectovaginal exam without nodularity/masses/visible blood on glove 18

## 2022-09-27 DIAGNOSIS — Z85.850 HISTORY OF THYROID CANCER: ICD-10-CM

## 2022-09-29 RX ORDER — LEVOTHYROXINE SODIUM 0.1 MG/1
TABLET ORAL
Qty: 90 TABLET | Refills: 3 | Status: SHIPPED | OUTPATIENT
Start: 2022-09-29

## 2022-10-05 ENCOUNTER — OFFICE VISIT (OUTPATIENT)
Dept: FAMILY MEDICINE CLINIC | Facility: CLINIC | Age: 72
End: 2022-10-05
Payer: MEDICARE

## 2022-10-05 VITALS
HEIGHT: 62 IN | TEMPERATURE: 98 F | DIASTOLIC BLOOD PRESSURE: 84 MMHG | OXYGEN SATURATION: 100 % | WEIGHT: 175.5 LBS | HEART RATE: 95 BPM | RESPIRATION RATE: 18 BRPM | BODY MASS INDEX: 32.3 KG/M2 | SYSTOLIC BLOOD PRESSURE: 130 MMHG

## 2022-10-05 DIAGNOSIS — E78.5 HYPERLIPIDEMIA, UNSPECIFIED HYPERLIPIDEMIA TYPE: ICD-10-CM

## 2022-10-05 DIAGNOSIS — Z12.31 ENCOUNTER FOR SCREENING MAMMOGRAM FOR MALIGNANT NEOPLASM OF BREAST: ICD-10-CM

## 2022-10-05 DIAGNOSIS — Z23 FLU VACCINE NEED: Primary | ICD-10-CM

## 2022-10-05 DIAGNOSIS — C73 CARCINOMA OF THYROID (HCC): ICD-10-CM

## 2022-10-05 DIAGNOSIS — Z00.00 PERIODIC HEALTH ASSESSMENT, GENERAL SCREENING, ADULT: ICD-10-CM

## 2022-10-05 DIAGNOSIS — I10 ESSENTIAL HYPERTENSION: ICD-10-CM

## 2022-10-05 DIAGNOSIS — Z85.850 HISTORY OF THYROID CANCER: ICD-10-CM

## 2022-10-05 PROBLEM — U07.1 COVID-19 VIRUS INFECTION: Status: RESOLVED | Noted: 2020-12-22 | Resolved: 2022-10-05

## 2022-10-05 PROCEDURE — G0008 ADMIN INFLUENZA VIRUS VAC: HCPCS

## 2022-10-05 PROCEDURE — 90662 IIV NO PRSV INCREASED AG IM: CPT

## 2022-10-05 PROCEDURE — 99213 OFFICE O/P EST LOW 20 MIN: CPT | Performed by: FAMILY MEDICINE

## 2022-10-05 PROCEDURE — G0439 PPPS, SUBSEQ VISIT: HCPCS | Performed by: FAMILY MEDICINE

## 2022-10-05 NOTE — ASSESSMENT & PLAN NOTE
Hyperlipidemia    Patient will check lipid panel blood work continue with current dose of statin therapy

## 2022-10-05 NOTE — ASSESSMENT & PLAN NOTE
History of thyroid cancer    Patient continues to see her general surgeon for monitoring of history of thyroid cancer and TFT blood work

## 2022-10-05 NOTE — PATIENT INSTRUCTIONS
Medicare Preventive Visit Patient Instructions  Thank you for completing your Welcome to Medicare Visit or Medicare Annual Wellness Visit today  Your next wellness visit will be due in one year (10/6/2023)  The screening/preventive services that you may require over the next 5-10 years are detailed below  Some tests may not apply to you based off risk factors and/or age  Screening tests ordered at today's visit but not completed yet may show as past due  Also, please note that scanned in results may not display below  Preventive Screenings:  Service Recommendations Previous Testing/Comments   Colorectal Cancer Screening  * Colonoscopy    * Fecal Occult Blood Test (FOBT)/Fecal Immunochemical Test (FIT)  * Fecal DNA/Cologuard Test  * Flexible Sigmoidoscopy Age: 39-70 years old   Colonoscopy: every 10 years (may be performed more frequently if at higher risk)  OR  FOBT/FIT: every 1 year  OR  Cologuard: every 3 years  OR  Sigmoidoscopy: every 5 years  Screening may be recommended earlier than age 39 if at higher risk for colorectal cancer  Also, an individualized decision between you and your healthcare provider will decide whether screening between the ages of 74-80 would be appropriate  Colonoscopy: 10/07/2020  FOBT/FIT: Not on file  Cologuard: 10/07/2020  Sigmoidoscopy: Not on file    Screening Current     Breast Cancer Screening Age: 36 years old  Frequency: every 1-2 years  Not required if history of left and right mastectomy Mammogram: 06/15/2020        Cervical Cancer Screening Between the ages of 21-29, pap smear recommended once every 3 years  Between the ages of 33-67, can perform pap smear with HPV co-testing every 5 years     Recommendations may differ for women with a history of total hysterectomy, cervical cancer, or abnormal pap smears in past  Pap Smear: 07/09/2020    Screening Not Indicated   Hepatitis C Screening Once for adults born between 1945 and 1965  More frequently in patients at high risk for Hepatitis C Hep C Antibody: Not on file        Diabetes Screening 1-2 times per year if you're at risk for diabetes or have pre-diabetes Fasting glucose: 105 mg/dL (10/22/2020)  A1C: No results in last 5 years (No results in last 5 years)      Cholesterol Screening Once every 5 years if you don't have a lipid disorder  May order more often based on risk factors  Lipid panel: 10/25/2019    Screening Not Indicated  History Lipid Disorder     Other Preventive Screenings Covered by Medicare:  1  Abdominal Aortic Aneurysm (AAA) Screening: covered once if your at risk  You're considered to be at risk if you have a family history of AAA  2  Lung Cancer Screening: covers low dose CT scan once per year if you meet all of the following conditions: (1) Age 50-69; (2) No signs or symptoms of lung cancer; (3) Current smoker or have quit smoking within the last 15 years; (4) You have a tobacco smoking history of at least 20 pack years (packs per day multiplied by number of years you smoked); (5) You get a written order from a healthcare provider  3  Glaucoma Screening: covered annually if you're considered high risk: (1) You have diabetes OR (2) Family history of glaucoma OR (3)  aged 48 and older OR (3)  American aged 72 and older  3  Osteoporosis Screening: covered every 2 years if you meet one of the following conditions: (1) You're estrogen deficient and at risk for osteoporosis based off medical history and other findings; (2) Have a vertebral abnormality; (3) On glucocorticoid therapy for more than 3 months; (4) Have primary hyperparathyroidism; (5) On osteoporosis medications and need to assess response to drug therapy  · Last bone density test (DXA Scan): Not on file  5  HIV Screening: covered annually if you're between the age of 12-76  Also covered annually if you are younger than 13 and older than 72 with risk factors for HIV infection   For pregnant patients, it is covered up to 3 times per pregnancy  Immunizations:  Immunization Recommendations   Influenza Vaccine Annual influenza vaccination during flu season is recommended for all persons aged >= 6 months who do not have contraindications   Pneumococcal Vaccine   * Pneumococcal conjugate vaccine = PCV13 (Prevnar 13), PCV15 (Vaxneuvance), PCV20 (Prevnar 20)  * Pneumococcal polysaccharide vaccine = PPSV23 (Pneumovax) Adults 25-60 years old: 1-3 doses may be recommended based on certain risk factors  Adults 72 years old: 1-2 doses may be recommended based off what pneumonia vaccine you previously received   Hepatitis B Vaccine 3 dose series if at intermediate or high risk (ex: diabetes, end stage renal disease, liver disease)   Tetanus (Td) Vaccine - COST NOT COVERED BY MEDICARE PART B Following completion of primary series, a booster dose should be given every 10 years to maintain immunity against tetanus  Td may also be given as tetanus wound prophylaxis  Tdap Vaccine - COST NOT COVERED BY MEDICARE PART B Recommended at least once for all adults  For pregnant patients, recommended with each pregnancy  Shingles Vaccine (Shingrix) - COST NOT COVERED BY MEDICARE PART B  2 shot series recommended in those aged 48 and above     Health Maintenance Due:      Topic Date Due    Hepatitis C Screening  Never done    Breast Cancer Screening: Mammogram  06/15/2022    Colorectal Cancer Screening  10/07/2023     Immunizations Due:      Topic Date Due    COVID-19 Vaccine (4 - Booster for Pfizer series) 02/28/2022    Influenza Vaccine (1) 09/01/2022     Advance Directives   What are advance directives? Advance directives are legal documents that state your wishes and plans for medical care  These plans are made ahead of time in case you lose your ability to make decisions for yourself  Advance directives can apply to any medical decision, such as the treatments you want, and if you want to donate organs  What are the types of advance directives? There are many types of advance directives, and each state has rules about how to use them  You may choose a combination of any of the following:  · Living will: This is a written record of the treatment you want  You can also choose which treatments you do not want, which to limit, and which to stop at a certain time  This includes surgery, medicine, IV fluid, and tube feedings  · Durable power of  for healthcare [facility]): This is a written record that states who you want to make healthcare choices for you when you are unable to make them for yourself  This person, called a proxy, is usually a family member or a friend  You may choose more than 1 proxy  · Do not resuscitate (DNR) order:  A DNR order is used in case your heart stops beating or you stop breathing  It is a request not to have certain forms of treatment, such as CPR  A DNR order may be included in other types of advance directives  · Medical directive: This covers the care that you want if you are in a coma, near death, or unable to make decisions for yourself  You can list the treatments you want for each condition  Treatment may include pain medicine, surgery, blood transfusions, dialysis, IV or tube feedings, and a ventilator (breathing machine)  · Values history: This document has questions about your views, beliefs, and how you feel and think about life  This information can help others choose the care that you would choose  Why are advance directives important? An advance directive helps you control your care  Although spoken wishes may be used, it is better to have your wishes written down  Spoken wishes can be misunderstood, or not followed  Treatments may be given even if you do not want them  An advance directive may make it easier for your family to make difficult choices about your care     Weight Management   Why it is important to manage your weight:  Being overweight increases your risk of health conditions such as heart disease, high blood pressure, type 2 diabetes, and certain types of cancer  It can also increase your risk for osteoarthritis, sleep apnea, and other respiratory problems  Aim for a slow, steady weight loss  Even a small amount of weight loss can lower your risk of health problems  How to lose weight safely:  A safe and healthy way to lose weight is to eat fewer calories and get regular exercise  You can lose up about 1 pound a week by decreasing the number of calories you eat by 500 calories each day  Healthy meal plan for weight management:  A healthy meal plan includes a variety of foods, contains fewer calories, and helps you stay healthy  A healthy meal plan includes the following:  · Eat whole-grain foods more often  A healthy meal plan should contain fiber  Fiber is the part of grains, fruits, and vegetables that is not broken down by your body  Whole-grain foods are healthy and provide extra fiber in your diet  Some examples of whole-grain foods are whole-wheat breads and pastas, oatmeal, brown rice, and bulgur  · Eat a variety of vegetables every day  Include dark, leafy greens such as spinach, kale, jimenez greens, and mustard greens  Eat yellow and orange vegetables such as carrots, sweet potatoes, and winter squash  · Eat a variety of fruits every day  Choose fresh or canned fruit (canned in its own juice or light syrup) instead of juice  Fruit juice has very little or no fiber  · Eat low-fat dairy foods  Drink fat-free (skim) milk or 1% milk  Eat fat-free yogurt and low-fat cottage cheese  Try low-fat cheeses such as mozzarella and other reduced-fat cheeses  · Choose meat and other protein foods that are low in fat  Choose beans or other legumes such as split peas or lentils  Choose fish, skinless poultry (chicken or turkey), or lean cuts of red meat (beef or pork)  Before you cook meat or poultry, cut off any visible fat  · Use less fat and oil  Try baking foods instead of frying them  Add less fat, such as margarine, sour cream, regular salad dressing and mayonnaise to foods  Eat fewer high-fat foods  Some examples of high-fat foods include french fries, doughnuts, ice cream, and cakes  · Eat fewer sweets  Limit foods and drinks that are high in sugar  This includes candy, cookies, regular soda, and sweetened drinks  Exercise:  Exercise at least 30 minutes per day on most days of the week  Some examples of exercise include walking, biking, dancing, and swimming  You can also fit in more physical activity by taking the stairs instead of the elevator or parking farther away from stores  Ask your healthcare provider about the best exercise plan for you  © Copyright [employer] 2018 Information is for End User's use only and may not be sold, redistributed or otherwise used for commercial purposes   All illustrations and images included in CareNotes® are the copyrighted property of [employer]  or [address] [place] [address]

## 2022-10-05 NOTE — PROGRESS NOTES
Assessment and Plan:     Problem List Items Addressed This Visit        Endocrine    Carcinoma of thyroid ([address] )       Other    Hyperlipidemia     Hyperlipidemia  Patient will check lipid panel blood work continue with current dose of statin therapy         Relevant Orders    CBC    Comprehensive metabolic panel    Lipid panel    History of thyroid cancer     History of thyroid cancer  Patient continues to see her general surgeon for monitoring of history of thyroid cancer and TFT blood work           Other Visit Diagnoses     Flu vaccine need    -  Primary    Relevant Orders    influenza vaccine, high-dose, PF 0 7 mL (FLUZONE HIGH-DOSE) (Completed)    Essential hypertension        Relevant Orders    CBC    Comprehensive metabolic panel    Lipid panel    Periodic health assessment, general screening, adult        Relevant Orders    Mammo screening bilateral w 3d & cad    Encounter for screening mammogram for malignant neoplasm of breast         Relevant Orders    Mammo screening bilateral w 3d & cad           Preventive health issues were discussed with patient, and age appropriate screening tests were ordered as noted in patient's After Visit Summary  Personalized health advice and appropriate referrals for health education or preventive services given if needed, as noted in patient's After Visit Summary  History of Present Illness:     Patient presents for a Medicare Wellness Visit    Patient in the office to review chronic medical condition  She denies any recent illness  She recently returned from a cruise trip to [place]  Her cologuard is up-to-date  She is due for mammogram this year  Patient Care Team:  Rosangela Yen MD as PCP - Guy Duran MD     Review of Systems:     Review of Systems   Constitutional: Negative  HENT: Negative  Eyes: Negative  Respiratory: Negative  Cardiovascular: Negative  Gastrointestinal: Negative  Genitourinary: Negative      Musculoskeletal: Negative  Skin: Negative  Neurological: Negative  Psychiatric/Behavioral: Negative           Problem List:     Patient Active Problem List   Diagnosis    Benign essential hypertension    Carcinoma of thyroid ([facility] )    Hyperlipidemia    Subclinical hyperthyroidism    History of thyroid cancer      Past Medical and Surgical History:     Past Medical History:   Diagnosis Date    Carcinoma of thyroid ([facility] )     Last Assessed:  8/29/17     Past Surgical History:   Procedure Laterality Date    DIAGNOSTIC LAPAROSCOPY      EXPLORATORY LAPAROTOMY      SALPINGOOPHORECTOMY Left     TOTAL THYROIDECTOMY      TUBAL LIGATION        Family History:     Family History   Problem Relation Age of Onset    Colon cancer Mother     Heart disease Father     Other Father         Stroke syndrome    Colon cancer Brother     Other Son         Brain tumor      Social History:     Social History     Socioeconomic History    Marital status: /Civil Union     Spouse name: None    Number of children: None    Years of education: None    Highest education level: None   Occupational History    Occupation: retired   Tobacco Use    Smoking status: Never Smoker    Smokeless tobacco: Never Used   Vaping Use    Vaping Use: Never used   Substance and Sexual Activity    Alcohol use: Yes     Comment: Social drinker    Drug use: No    Sexual activity: Yes     Partners: Male     Birth control/protection: Post-menopausal   Other Topics Concern    None   Social History Narrative    Caffeine use    Uses safety equipment - seatbelts     Social Determinants of Health     Financial Resource Strain: Not on file   Food Insecurity: Not on file   Transportation Needs: Not on file   Physical Activity: Not on file   Stress: Not on file   Social Connections: Not on file   Intimate Partner Violence: Not on file   Housing Stability: Not on file      Medications and Allergies:     Current Outpatient Medications   Medication Sig Dispense Refill    Glucosamine-Chondroit-Vit C-Mn (TH GLUCOSAMINE-CHONDROITIN) CAPS Take by mouth      levothyroxine 100 mcg tablet TAKE 1 TABLET DAILY 90 tablet 3    losartan (COZAAR) 100 MG tablet TAKE 1 TABLET DAILY 90 tablet 0    MULTIPLE VITAMINS PO Take by mouth      Omega-3 Fatty Acids (FISH OIL) 1,000 mg Take by mouth      pravastatin (PRAVACHOL) 40 mg tablet TAKE 1 TABLET DAILY 90 tablet 0    Red Yeast Rice 600 MG TABS Take by mouth       No current facility-administered medications for this visit  No Known Allergies   Immunizations:     Immunization History   Administered Date(s) Administered    COVID-19 PFIZER VACCINE 0 3 ML IM 02/03/2021, 02/24/2021, 10/30/2021    INFLUENZA 10/26/2018, 10/25/2019    Influenza Split High Dose Preservative Free IM 10/25/2019    Influenza, high dose seasonal 0 7 mL 10/01/2020, 10/04/2021, 10/05/2022    Influenza, seasonal, injectable, preservative free 10/25/2019    Pneumococcal Conjugate 13-Valent 04/12/2017    Pneumococcal Polysaccharide PPV23 05/29/2018    Tdap 11/12/2019    Zoster 10/31/2016      Health Maintenance:         Topic Date Due    Hepatitis C Screening  Never done    Breast Cancer Screening: Mammogram  06/15/2022    Colorectal Cancer Screening  10/07/2023         Topic Date Due    COVID-19 Vaccine (4 - Booster for Landaverde Peter series) 02/28/2022      Medicare Screening Tests and Risk Assessments:         Health Risk Assessment:   Patient rates overall health as excellent  Patient feels that their physical health rating is same  Patient is very satisfied with their life  Eyesight was rated as same  Hearing was rated as same  Patient feels that their emotional and mental health rating is same  Patients states they are never, rarely angry  Patient states they are never, rarely unusually tired/fatigued  Pain experienced in the last 7 days has been none  Patient states that she has experienced no weight loss or gain in last 6 months       Depression Screening:   PHQ-2 Score: 0      Fall Risk Screening: In the past year, patient has experienced: no history of falling in past year      Urinary Incontinence Screening:   Patient has not leaked urine accidently in the last six months  Home Safety:  Patient does not have trouble with stairs inside or outside of their home  Patient has working smoke alarms and has no working carbon monoxide detector  Home safety hazards include: none  Nutrition:   Current diet is Low Cholesterol, No Added Salt and Limited junk food  Medications:   Patient is currently taking over-the-counter supplements  OTC medications include: Fish oil, L-lysine  Patient is able to manage medications  Activities of Daily Living (ADLs)/Instrumental Activities of Daily Living (IADLs):   Walk and transfer into and out of bed and chair?: Yes  Dress and groom yourself?: Yes    Bathe or shower yourself?: Yes    Feed yourself?  Yes  Do your laundry/housekeeping?: Yes  Manage your money, pay your bills and track your expenses?: Yes  Make your own meals?: Yes    Do your own shopping?: Yes    Previous Hospitalizations:   Any hospitalizations or ED visits within the last 12 months?: No      Advance Care Planning:   Living will: No    Durable POA for healthcare: No    Advanced directive: No      PREVENTIVE SCREENINGS      Cardiovascular Screening:    General: Screening Not Indicated, History Lipid Disorder and Risks and Benefits Discussed    Due for: Lipid Panel      Diabetes Screening:     General: Risks and Benefits Discussed    Due for: Blood Glucose      Colorectal Cancer Screening:     General: Screening Current      Breast Cancer Screening:     General: Risks and Benefits Discussed    Due for: Mammogram        Cervical Cancer Screening:    General: Screening Not Indicated      Lung Cancer Screening:     General: Screening Not Indicated    Screening, Brief Intervention, and Referral to Treatment (SBIRT)    Screening  Typical number of drinks in a day: 0  Typical number of drinks in a week: 1  Interpretation: Low risk drinking behavior  AUDIT-C Screenin) How often did you have a drink containing alcohol in the past year? monthly or less  2) How many drinks did you have on a typical day when you were drinking in the past year? 1 to 2  3) How often did you have 6 or more drinks on one occasion in the past year? never    AUDIT-C Score: 1  Interpretation: Score 0-2 (female): Negative screen for alcohol misuse    Single Item Drug Screening:  How often have you used an illegal drug (including marijuana) or a prescription medication for non-medical reasons in the past year? never    Single Item Drug Screen Score: 0  Interpretation: Negative screen for possible drug use disorder    No exam data present     Physical Exam:     /84   Pulse 95   Temp 98 °F (36 7 °C)   Resp 18   Ht 5' 2" (1 575 m)   Wt 79 6 kg (175 lb 8 oz)   LMP  (LMP Unknown)   SpO2 100%   BMI 32 10 kg/m²     Physical Exam  Vitals and nursing note reviewed  Constitutional:       General: She is not in acute distress  Appearance: She is well-developed  HENT:      Head: Normocephalic and atraumatic  Eyes:      Extraocular Movements: Extraocular movements intact  Conjunctiva/sclera: Conjunctivae normal    Cardiovascular:      Rate and Rhythm: Normal rate and regular rhythm  Heart sounds: No murmur heard  Pulmonary:      Effort: Pulmonary effort is normal  No respiratory distress  Breath sounds: Normal breath sounds  Abdominal:      General: Abdomen is flat  Bowel sounds are normal  There is no distension  Palpations: Abdomen is soft  Tenderness: There is no abdominal tenderness  There is no guarding or rebound  Musculoskeletal:      Cervical back: Neck supple  Skin:     General: Skin is warm and dry  Neurological:      Mental Status: She is alert  Mental status is at baseline     Psychiatric:         Mood and Affect: Mood normal  Behavior: Behavior normal          Thought Content:  Thought content normal          Judgment: Judgment normal           Marthe Krabbe, MD

## 2022-10-14 ENCOUNTER — APPOINTMENT (OUTPATIENT)
Dept: LAB | Facility: CLINIC | Age: 72
End: 2022-10-14
Payer: MEDICARE

## 2022-10-14 ENCOUNTER — TELEPHONE (OUTPATIENT)
Dept: FAMILY MEDICINE CLINIC | Facility: CLINIC | Age: 72
End: 2022-10-14

## 2022-10-14 DIAGNOSIS — I10 ESSENTIAL HYPERTENSION: ICD-10-CM

## 2022-10-14 DIAGNOSIS — E78.5 HYPERLIPIDEMIA, UNSPECIFIED HYPERLIPIDEMIA TYPE: ICD-10-CM

## 2022-10-14 LAB
ALBUMIN SERPL BCP-MCNC: 3.6 G/DL (ref 3.5–5)
ALP SERPL-CCNC: 99 U/L (ref 46–116)
ALT SERPL W P-5'-P-CCNC: 26 U/L (ref 12–78)
ANION GAP SERPL CALCULATED.3IONS-SCNC: 5 MMOL/L (ref 4–13)
AST SERPL W P-5'-P-CCNC: 13 U/L (ref 5–45)
BILIRUB SERPL-MCNC: 0.33 MG/DL (ref 0.2–1)
BUN SERPL-MCNC: 17 MG/DL (ref 5–25)
CALCIUM SERPL-MCNC: 8.7 MG/DL (ref 8.3–10.1)
CHLORIDE SERPL-SCNC: 109 MMOL/L (ref 96–108)
CHOLEST SERPL-MCNC: 179 MG/DL
CO2 SERPL-SCNC: 26 MMOL/L (ref 21–32)
CREAT SERPL-MCNC: 0.89 MG/DL (ref 0.6–1.3)
ERYTHROCYTE [DISTWIDTH] IN BLOOD BY AUTOMATED COUNT: 12.9 % (ref 11.6–15.1)
GFR SERPL CREATININE-BSD FRML MDRD: 64 ML/MIN/1.73SQ M
GLUCOSE P FAST SERPL-MCNC: 100 MG/DL (ref 65–99)
HCT VFR BLD AUTO: 38.2 % (ref 34.8–46.1)
HDLC SERPL-MCNC: 64 MG/DL
HGB BLD-MCNC: 12.2 G/DL (ref 11.5–15.4)
LDLC SERPL CALC-MCNC: 98 MG/DL (ref 0–100)
MCH RBC QN AUTO: 28.9 PG (ref 26.8–34.3)
MCHC RBC AUTO-ENTMCNC: 31.9 G/DL (ref 31.4–37.4)
MCV RBC AUTO: 91 FL (ref 82–98)
NONHDLC SERPL-MCNC: 115 MG/DL
PLATELET # BLD AUTO: 333 THOUSANDS/UL (ref 149–390)
PMV BLD AUTO: 9.7 FL (ref 8.9–12.7)
POTASSIUM SERPL-SCNC: 4.3 MMOL/L (ref 3.5–5.3)
PROT SERPL-MCNC: 7.5 G/DL (ref 6.4–8.4)
RBC # BLD AUTO: 4.22 MILLION/UL (ref 3.81–5.12)
SODIUM SERPL-SCNC: 140 MMOL/L (ref 135–147)
TRIGL SERPL-MCNC: 83 MG/DL
WBC # BLD AUTO: 6.96 THOUSAND/UL (ref 4.31–10.16)

## 2022-10-14 PROCEDURE — 80061 LIPID PANEL: CPT

## 2022-10-14 PROCEDURE — 85027 COMPLETE CBC AUTOMATED: CPT

## 2022-10-14 PROCEDURE — 80053 COMPREHEN METABOLIC PANEL: CPT

## 2022-10-14 PROCEDURE — 36415 COLL VENOUS BLD VENIPUNCTURE: CPT

## 2022-10-14 NOTE — TELEPHONE ENCOUNTER
----- Message from Kinjal Aj MD sent at 66/80/2459 12:29 PM EDT -----  All recent blood work stable for patient

## 2022-10-26 ENCOUNTER — OFFICE VISIT (OUTPATIENT)
Dept: SURGERY | Facility: CLINIC | Age: 72
End: 2022-10-26
Payer: MEDICARE

## 2022-10-26 VITALS
WEIGHT: 174 LBS | SYSTOLIC BLOOD PRESSURE: 143 MMHG | RESPIRATION RATE: 12 BRPM | OXYGEN SATURATION: 98 % | DIASTOLIC BLOOD PRESSURE: 72 MMHG | HEART RATE: 118 BPM | HEIGHT: 62 IN | BODY MASS INDEX: 32.02 KG/M2 | TEMPERATURE: 97.5 F

## 2022-10-26 DIAGNOSIS — Z85.850 HISTORY OF THYROID CANCER: Primary | ICD-10-CM

## 2022-10-26 PROCEDURE — 99213 OFFICE O/P EST LOW 20 MIN: CPT | Performed by: SURGERY

## 2022-10-26 NOTE — PROGRESS NOTES
Office Visit - General Surgery  Jaison Kaplan MRN: [id number]  Encounter: [id number]    Assessment and Plan  Problem List Items Addressed This Visit        Other    History of thyroid cancer - Primary     Doing well with no evidence of disease  Will recheck labs to see where we stand  Overall doing well see her back in a year's time  Relevant Orders    T3, free    TSH, 3rd generation with Free T4 reflex    Thyroglobulin Panel    Thyroid Antibodies Panel    T4, free          Chief Complaint:  Jaison Kaplan is a 67 y o  female who presents for Thyroid Problem (Yearly thyroid)    Subjective  67year old female returns for thyroid cancer follow-up  She has no new complaints problems regarding her thyroid  She has no hyper or hypothyroid symptomatology or compressive symptoms  Overall she feels well      Past Medical History:   Diagnosis Date   • Carcinoma of thyroid [facility])     Last Assessed:  8/29/17       Past Surgical History:   Procedure Laterality Date   • DIAGNOSTIC LAPAROSCOPY     • EXPLORATORY LAPAROTOMY     • SALPINGOOPHORECTOMY Left    • TOTAL THYROIDECTOMY     • TUBAL LIGATION         Family History   Problem Relation Age of Onset   • Colon cancer Mother    • Heart disease Father    • Other Father         Stroke syndrome   • Colon cancer Brother    • Other Son         Brain tumor       Social History     Tobacco Use   • Smoking status: Never Smoker   • Smokeless tobacco: Never Used   Vaping Use   • Vaping Use: Never used   Substance Use Topics   • Alcohol use: Yes     Comment: Social drinker   • Drug use: No        Medications  Current Outpatient Medications on File Prior to Visit   Medication Sig Dispense Refill   • Glucosamine-Chondroit-Vit C-Mn (TH GLUCOSAMINE-CHONDROITIN) CAPS Take by mouth     • levothyroxine 100 mcg tablet TAKE 1 TABLET DAILY 90 tablet 3   • losartan (COZAAR) 100 MG tablet TAKE 1 TABLET DAILY 90 tablet 0   • MULTIPLE VITAMINS PO Take by mouth     • Omega-3 Fatty Acids (FISH OIL) 1,000 mg Take by mouth     • pravastatin (PRAVACHOL) 40 mg tablet TAKE 1 TABLET DAILY 90 tablet 0   • Red Yeast Rice 600 MG TABS Take by mouth       No current facility-administered medications on file prior to visit  Allergies  No Known Allergies    Review of Systems   Constitutional: Negative for chills and fever  HENT: Negative for ear pain and sore throat  Eyes: Negative for pain and visual disturbance  Respiratory: Negative for cough and shortness of breath  Cardiovascular: Negative for chest pain and palpitations  Gastrointestinal: Negative for abdominal pain and vomiting  Endocrine: Negative for cold intolerance and heat intolerance  Genitourinary: Negative for dysuria and hematuria  Musculoskeletal: Negative for arthralgias and back pain  Skin: Negative for color change and rash  Neurological: Negative for seizures and syncope  Hematological: Does not bruise/bleed easily  All other systems reviewed and are negative  Objective  Vitals:    10/26/22 0812   BP: 143/72   Pulse: (!) 118   Resp: 12   Temp: 97 5 °F (36 4 °C)   SpO2: 98%       Physical Exam  Vitals and nursing note reviewed  Constitutional:       General: She is not in acute distress  Appearance: She is well-developed  She is not diaphoretic  HENT:      Head: Normocephalic and atraumatic  Right Ear: External ear normal       Left Ear: External ear normal    Eyes:      General: No scleral icterus  Conjunctiva/sclera: Conjunctivae normal    Neck:      Thyroid: No thyromegaly  Trachea: No tracheal deviation  Comments: Well-healed anterior transverse incision  No nodes palpable anywhere in the neck  Cardiovascular:      Rate and Rhythm: Normal rate and regular rhythm  Heart sounds: Normal heart sounds  No murmur heard  No friction rub  Pulmonary:      Effort: Pulmonary effort is normal  No respiratory distress  Breath sounds: Normal breath sounds   No wheezing or rales  Abdominal:      General: There is no distension  Palpations: Abdomen is soft  There is no mass  Tenderness: There is no abdominal tenderness  There is no guarding or rebound  Musculoskeletal:         General: Normal range of motion  Cervical back: Neck supple  Lymphadenopathy:      Cervical: No cervical adenopathy  Skin:     General: Skin is warm and dry  Neurological:      Mental Status: She is alert and oriented to person, place, and time  Psychiatric:         Behavior: Behavior normal          Thought Content:  Thought content normal          Judgment: Judgment normal

## 2022-10-26 NOTE — ASSESSMENT & PLAN NOTE
Doing well with no evidence of disease  Will recheck labs to see where we stand  Overall doing well see her back in a year's time

## 2022-11-01 ENCOUNTER — APPOINTMENT (OUTPATIENT)
Dept: LAB | Facility: CLINIC | Age: 72
End: 2022-11-01

## 2022-11-01 DIAGNOSIS — Z85.850 HISTORY OF THYROID CANCER: ICD-10-CM

## 2022-11-01 LAB
T3FREE SERPL-MCNC: 3.13 PG/ML (ref 2.3–4.2)
T4 FREE SERPL-MCNC: 1.46 NG/DL (ref 0.76–1.46)
TSH SERPL DL<=0.05 MIU/L-ACNC: <0.007 UIU/ML (ref 0.45–4.5)

## 2022-11-02 LAB
THYROGLOB AB SERPL-ACNC: <1 IU/ML (ref 0–0.9)
THYROGLOB AB SERPL-ACNC: <1 IU/ML (ref 0–0.9)
THYROGLOB SERPL-MCNC: <0.1 NG/ML (ref 1.5–38.5)
THYROPEROXIDASE AB SERPL-ACNC: <8 IU/ML (ref 0–34)

## 2022-12-12 DIAGNOSIS — E78.5 HYPERLIPIDEMIA, UNSPECIFIED HYPERLIPIDEMIA TYPE: ICD-10-CM

## 2022-12-12 DIAGNOSIS — I10 ESSENTIAL HYPERTENSION: ICD-10-CM

## 2022-12-12 RX ORDER — PRAVASTATIN SODIUM 40 MG
TABLET ORAL
Qty: 90 TABLET | Refills: 0 | Status: SHIPPED | OUTPATIENT
Start: 2022-12-12

## 2022-12-12 RX ORDER — LOSARTAN POTASSIUM 100 MG/1
TABLET ORAL
Qty: 90 TABLET | Refills: 0 | Status: SHIPPED | OUTPATIENT
Start: 2022-12-12

## 2023-01-20 ENCOUNTER — OFFICE VISIT (OUTPATIENT)
Dept: FAMILY MEDICINE CLINIC | Facility: CLINIC | Age: 73
End: 2023-01-20

## 2023-01-20 VITALS
HEIGHT: 62 IN | OXYGEN SATURATION: 95 % | BODY MASS INDEX: 32.64 KG/M2 | TEMPERATURE: 98.4 F | SYSTOLIC BLOOD PRESSURE: 160 MMHG | WEIGHT: 177.38 LBS | RESPIRATION RATE: 17 BRPM | HEART RATE: 102 BPM | DIASTOLIC BLOOD PRESSURE: 82 MMHG

## 2023-01-20 DIAGNOSIS — J06.9 UPPER RESPIRATORY TRACT INFECTION, UNSPECIFIED TYPE: Primary | ICD-10-CM

## 2023-01-20 RX ORDER — PREDNISONE 20 MG/1
20 TABLET ORAL DAILY
Qty: 5 TABLET | Refills: 0 | Status: SHIPPED | OUTPATIENT
Start: 2023-01-20

## 2023-01-20 RX ORDER — AMOXICILLIN 500 MG/1
CAPSULE ORAL
COMMUNITY
Start: 2022-10-27

## 2023-01-20 RX ORDER — AZITHROMYCIN 250 MG/1
TABLET, FILM COATED ORAL
Qty: 6 TABLET | Refills: 0 | Status: SHIPPED | OUTPATIENT
Start: 2023-01-20 | End: 2023-01-25

## 2023-01-20 NOTE — PROGRESS NOTES
FAMILY PRACTICE OFFICE VISIT       NAME: Blake Alvarez  AGE: 67 y o  SEX: female       : 1950        MRN: [id number]    DATE: 2023  TIME: 9:37 AM    Assessment and Plan     Problem List Items Addressed This Visit        Respiratory    Upper respiratory tract infection - Primary     URI  Patient given prescription for Zithromax Z-Joselito to take as directed for 5 days  She will also take prednisone 20 mg daily for 5 days  Patient will call if symptoms persist after medication completed         Relevant Medications    amoxicillin (AMOXIL) 500 mg capsule    azithromycin (Zithromax) 250 mg tablet    predniSONE 20 mg tablet           Chief Complaint     Chief Complaint   Patient presents with   • Cough     Congestion x 5 days        History of Present Illness     Patient with a 5-day history of coughing and congestion  She has tried over-the-counter Robitussin and NyQuil  She denies any documented fevers  She is up-to-date with COVID vaccination  No other family members are sick at this time  She denies any chest pain or shortness of breath      Review of Systems   Review of Systems   Constitutional: Positive for fatigue  Negative for fever  HENT: Positive for congestion  Respiratory: Positive for cough  Cardiovascular: Negative  Gastrointestinal: Negative          Active Problem List     Patient Active Problem List   Diagnosis   • Benign essential hypertension   • Carcinoma of thyroid ([facility] [address] )   • Hyperlipidemia   • Subclinical hyperthyroidism   • History of thyroid cancer   • Upper respiratory tract infection       Past Medical History:  Past Medical History:   Diagnosis Date   • Carcinoma of thyroid ([address] )     Last Assessed:  17       Past Surgical History:  Past Surgical History:   Procedure Laterality Date   • DIAGNOSTIC LAPAROSCOPY     • EXPLORATORY LAPAROTOMY     • SALPINGOOPHORECTOMY Left    • TOTAL THYROIDECTOMY     • TUBAL LIGATION         Family History:  Family History Problem Relation Age of Onset   • Colon cancer Mother    • Heart disease Father    • Other Father         Stroke syndrome   • Colon cancer Brother    • Other Son         Brain tumor       Social History:  Social History     Socioeconomic History   • Marital status: /Civil Union     Spouse name: Not on file   • Number of children: Not on file   • Years of education: Not on file   • Highest education level: Not on file   Occupational History   • Occupation: retired   Tobacco Use   • Smoking status: Never   • Smokeless tobacco: Never   Vaping Use   • Vaping Use: Never used   Substance and Sexual Activity   • Alcohol use: Yes     Comment: Social drinker   • Drug use: No   • Sexual activity: Yes     Partners: Male     Birth control/protection: Post-menopausal   Other Topics Concern   • Not on file   Social History Narrative    Caffeine use    Uses safety equipment - seatbelts     Social Determinants of Health     Financial Resource Strain: Not on file   Food Insecurity: Not on file   Transportation Needs: Not on file   Physical Activity: Not on file   Stress: Not on file   Social Connections: Not on file   Intimate Partner Violence: Not on file   Housing Stability: Not on file       Objective     Vitals:    01/20/23 0854   BP: 160/82   Pulse: 102   Resp: 17   Temp: 98 4 °F (36 9 °C)   SpO2: 95%     Wt Readings from Last 3 Encounters:   01/20/23 80 5 kg (177 lb 6 oz)   10/26/22 78 9 kg (174 lb)   10/05/22 79 6 kg (175 lb 8 oz)       Physical Exam  Constitutional:       General: She is not in acute distress  Appearance: Normal appearance  She is not ill-appearing  HENT:      Head: Normocephalic and atraumatic  Eyes:      General:         Right eye: No discharge  Left eye: No discharge  Extraocular Movements: Extraocular movements intact  Conjunctiva/sclera: Conjunctivae normal       Pupils: Pupils are equal, round, and reactive to light  Neck:      Vascular: No carotid bruit  Cardiovascular:      Rate and Rhythm: Normal rate and regular rhythm  Heart sounds: Normal heart sounds  No murmur heard  Pulmonary:      Effort: Pulmonary effort is normal       Breath sounds: Wheezing present  No rhonchi or rales  Comments: Mild pulmonary squeaks at the bases with end of expiration  Musculoskeletal:      Right lower leg: No edema  Left lower leg: No edema  Lymphadenopathy:      Cervical: No cervical adenopathy  Skin:     Findings: No rash  Neurological:      General: No focal deficit present  Mental Status: She is alert and oriented to person, place, and time  Cranial Nerves: No cranial nerve deficit  Psychiatric:         Mood and Affect: Mood normal          Behavior: Behavior normal          Thought Content:  Thought content normal          Judgment: Judgment normal          Pertinent Laboratory/Diagnostic Studies:  Lab Results   Component Value Date    GLUCOSE 94 08/13/2015    BUN 17 10/14/2022    CREATININE 0 89 10/14/2022    CALCIUM 8 7 10/14/2022     08/13/2015    K 4 3 10/14/2022    CO2 26 10/14/2022     (H) 10/14/2022     Lab Results   Component Value Date    ALT 26 10/14/2022    AST 13 10/14/2022    ALKPHOS 99 10/14/2022    BILITOT 0 53 08/13/2015       Lab Results   Component Value Date    WBC 6 96 10/14/2022    HGB 12 2 10/14/2022    HCT 38 2 10/14/2022    MCV 91 10/14/2022     10/14/2022       No results found for: TSH    Lab Results   Component Value Date    CHOL 200 08/13/2015     Lab Results   Component Value Date    TRIG 83 10/14/2022     Lab Results   Component Value Date    HDL 64 10/14/2022     Lab Results   Component Value Date    LDLCALC 98 10/14/2022     No results found for: HGBA1C    Results for orders placed or performed in visit on 11/01/22   T4, free   Result Value Ref Range    Free T4 1 46 0 76 - 1 46 ng/dL   Thyroglobulin   Result Value Ref Range    Thyroglobulin Ab <1 0 0 0 - 0 9 IU/mL   Anti-microsomal antibody Result Value Ref Range    THYROID MICROSOMAL ANTIBODY <8 0 - 34 IU/mL   Anti-thyroglobulin antibody   Result Value Ref Range    Thyroglobulin Ab <1 0 0 0 - 0 9 IU/mL   Thyroglobulin by ARMAAN   Result Value Ref Range    Thyroglobulin-ARMAAN <0 1 (L) 1 5 - 38 5 ng/mL       No orders of the defined types were placed in this encounter  ALLERGIES:  No Known Allergies    Current Medications     Current Outpatient Medications   Medication Sig Dispense Refill   • amoxicillin (AMOXIL) 500 mg capsule TAKE 4 CAPSULES 1 HOUR PRIOR TO DENTAL TREATMENT     • azithromycin (Zithromax) 250 mg tablet Take 2 tablets (500 mg total) by mouth daily for 1 day, THEN 1 tablet (250 mg total) daily for 4 days  6 tablet 0   • Glucosamine-Chondroit-Vit C-Mn (TH GLUCOSAMINE-CHONDROITIN) CAPS Take by mouth     • levothyroxine 100 mcg tablet TAKE 1 TABLET DAILY 90 tablet 3   • losartan (COZAAR) 100 MG tablet TAKE 1 TABLET DAILY 90 tablet 0   • MULTIPLE VITAMINS PO Take by mouth     • Omega-3 Fatty Acids (FISH OIL) 1,000 mg Take by mouth     • pravastatin (PRAVACHOL) 40 mg tablet TAKE 1 TABLET DAILY 90 tablet 0   • predniSONE 20 mg tablet Take 1 tablet (20 mg total) by mouth daily 5 tablet 0   • Red Yeast Rice 600 MG TABS Take by mouth       No current facility-administered medications for this visit           Health Maintenance     Health Maintenance   Topic Date Due   • Hepatitis C Screening  Never done   • [facility] PLAN OF CARE  Never done   • Osteoporosis Screening  Never done   • COVID-19 Vaccine (4 - Booster for Pfizer series) 12/25/2021   • Breast Cancer Screening: Mammogram  06/15/2022   • BMI: Followup Plan  10/04/2022   • Fall Risk  10/05/2023   • Depression Screening  10/05/2023   • Urinary Incontinence Screening  10/05/2023   • Medicare Annual Wellness Visit (AWV)  10/05/2023   • Colorectal Cancer Screening  10/07/2023   • BMI: Adult  01/20/2024   • Pneumococcal Vaccine: 65+ Years  Completed   • Influenza Vaccine  Completed   • HIB Vaccine Aged Out   • IPV Vaccine  Aged Out   • Hepatitis A Vaccine  Aged Out   • Meningococcal ACWY Vaccine  Aged Out   • HPV Vaccine  Aged Out     Immunization History   Administered Date(s) Administered   • COVID-19 PFIZER VACCINE 0 3 ML IM 02/03/2021, 02/24/2021, 10/30/2021   • COVID-19 Pfizer Vac BIVALENT Darryl-sucrose 12 Yr+ IM (BOOSTER ONLY) 10/24/2022   • INFLUENZA 10/26/2018, 10/25/2019, 10/05/2022   • Influenza Split High Dose Preservative Free IM 10/25/2019   • Influenza, high dose seasonal 0 7 mL 10/01/2020, 10/04/2021, 10/05/2022   • Influenza, seasonal, injectable, preservative free 10/25/2019   • Pneumococcal Conjugate 13-Valent 04/12/2017   • Pneumococcal Polysaccharide PPV23 05/29/2018   • Tdap 11/12/2019   • Zoster 82/10/4129       Chani Smith MD

## 2023-02-08 ENCOUNTER — OFFICE VISIT (OUTPATIENT)
Dept: FAMILY MEDICINE CLINIC | Facility: CLINIC | Age: 73
End: 2023-02-08

## 2023-02-08 ENCOUNTER — APPOINTMENT (OUTPATIENT)
Dept: RADIOLOGY | Facility: CLINIC | Age: 73
End: 2023-02-08

## 2023-02-08 VITALS
SYSTOLIC BLOOD PRESSURE: 132 MMHG | DIASTOLIC BLOOD PRESSURE: 90 MMHG | TEMPERATURE: 98.4 F | RESPIRATION RATE: 17 BRPM | WEIGHT: 174.5 LBS | BODY MASS INDEX: 32.11 KG/M2 | HEART RATE: 102 BPM | OXYGEN SATURATION: 95 % | HEIGHT: 62 IN

## 2023-02-08 DIAGNOSIS — R05.9 COUGH, UNSPECIFIED TYPE: Primary | ICD-10-CM

## 2023-02-08 DIAGNOSIS — R00.0 TACHYCARDIA: ICD-10-CM

## 2023-02-08 DIAGNOSIS — R05.9 COUGH, UNSPECIFIED TYPE: ICD-10-CM

## 2023-02-08 DIAGNOSIS — J06.9 UPPER RESPIRATORY TRACT INFECTION, UNSPECIFIED TYPE: ICD-10-CM

## 2023-02-08 RX ORDER — FLUTICASONE PROPIONATE AND SALMETEROL 14; 55 UG/1; UG/1
2 POWDER, METERED RESPIRATORY (INHALATION) 2 TIMES DAILY
Qty: 1 EACH | Refills: 0 | Status: SHIPPED | OUTPATIENT
Start: 2023-02-08

## 2023-02-08 NOTE — ASSESSMENT & PLAN NOTE
Cough  Patient will obtain chest x-ray for further evaluation  If x-ray is stable she was given prescription for air duo inhaler to use 2 puffs twice daily

## 2023-02-08 NOTE — ASSESSMENT & PLAN NOTE
Tachycardia  Patient with asymptomatic tachycardia  She was recommended to discontinue over-the-counter cough medicine with decongestant    She will monitor her blood pressure and pulse over the next 24 to 48 hours and call if it continues above 100 bpm

## 2023-02-08 NOTE — PROGRESS NOTES
FAMILY PRACTICE OFFICE VISIT       NAME: Sandi Solares  AGE: 67 y o  SEX: female       : 1950        MRN: [id number]    DATE: 2023  TIME: 12:45 PM    Assessment and Plan     Problem List Items Addressed This Visit        Respiratory    Upper respiratory tract infection       Other    Cough - Primary     Cough  Patient will obtain chest x-ray for further evaluation  If x-ray is stable she was given prescription for air duo inhaler to use 2 puffs twice daily  Relevant Medications    Fluticasone-Salmeterol,sensor, (AirDuo Digihaler) 55-14 MCG/ACT AEPB    Other Relevant Orders    XR chest pa & lateral    POCT ECG (Completed)    Tachycardia     Tachycardia  Patient with asymptomatic tachycardia  She was recommended to discontinue over-the-counter cough medicine with decongestant  She will monitor her blood pressure and pulse over the next 24 to 48 hours and call if it continues above 100 bpm                Chief Complaint     Chief Complaint   Patient presents with   • Cough     Congestion        History of Present Illness     Patient states she has been having a persistent cough since being seen and treated on 2023 for upper respiratory infection  She completed course of antibiotics and prednisone  She has been taking over-the-counter cough and cold preparations that has a decongestant  She denies any chest pain or shortness of breath  She has minimal mucus production  She denies any documented fevers      Review of Systems   Review of Systems   Constitutional: Negative  Respiratory: Positive for cough  Cardiovascular: Negative  Gastrointestinal: Negative  Psychiatric/Behavioral: Negative          Active Problem List     Patient Active Problem List   Diagnosis   • Benign essential hypertension   • Carcinoma of thyroid ([facility] [address] )   • Hyperlipidemia   • Subclinical hyperthyroidism   • History of thyroid cancer   • Upper respiratory tract infection   • Cough   • Tachycardia       Past Medical History:  Past Medical History:   Diagnosis Date   • Carcinoma of thyroid ([address] )     Last Assessed:  8/29/17       Past Surgical History:  Past Surgical History:   Procedure Laterality Date   • DIAGNOSTIC LAPAROSCOPY     • EXPLORATORY LAPAROTOMY     • SALPINGOOPHORECTOMY Left    • TOTAL THYROIDECTOMY     • TUBAL LIGATION         Family History:  Family History   Problem Relation Age of Onset   • Colon cancer Mother    • Heart disease Father    • Other Father         Stroke syndrome   • Colon cancer Brother    • Other Son         Brain tumor       Social History:  Social History     Socioeconomic History   • Marital status: /Civil Union     Spouse name: Not on file   • Number of children: Not on file   • Years of education: Not on file   • Highest education level: Not on file   Occupational History   • Occupation: retired   Tobacco Use   • Smoking status: Never   • Smokeless tobacco: Never   Vaping Use   • Vaping Use: Never used   Substance and Sexual Activity   • Alcohol use: Yes     Comment: Social drinker   • Drug use: No   • Sexual activity: Yes     Partners: Male     Birth control/protection: Post-menopausal   Other Topics Concern   • Not on file   Social History Narrative    Caffeine use    Uses safety equipment - seatbelts     Social Determinants of Health     Financial Resource Strain: Not on file   Food Insecurity: Not on file   Transportation Needs: Not on file   Physical Activity: Not on file   Stress: Not on file   Social Connections: Not on file   Intimate Partner Violence: Not on file   Housing Stability: Not on file       Objective     Vitals:    02/08/23 0955   BP: 132/90   Pulse: 102   Resp: 17   Temp: 98 4 °F (36 9 °C)   SpO2: 95%     Wt Readings from Last 3 Encounters:   02/08/23 79 2 kg (174 lb 8 oz)   01/20/23 80 5 kg (177 lb 6 oz)   10/26/22 78 9 kg (174 lb)       Physical Exam  Constitutional:       General: She is not in acute distress       Appearance: Normal appearance  She is not ill-appearing  HENT:      Right Ear: Tympanic membrane, ear canal and external ear normal  There is no impacted cerumen  Left Ear: Tympanic membrane, ear canal and external ear normal  There is no impacted cerumen  Eyes:      Extraocular Movements: Extraocular movements intact  Conjunctiva/sclera: Conjunctivae normal       Pupils: Pupils are equal, round, and reactive to light  Cardiovascular:      Rate and Rhythm: Regular rhythm  Tachycardia present  Heart sounds: Normal heart sounds  No murmur heard  Pulmonary:      Effort: Pulmonary effort is normal  No respiratory distress  Breath sounds: Normal breath sounds  No wheezing, rhonchi or rales  Comments: Harsh dry cough  Musculoskeletal:      Right lower leg: No edema  Left lower leg: No edema  Lymphadenopathy:      Cervical: No cervical adenopathy  Neurological:      Mental Status: She is alert  Mental status is at baseline  Psychiatric:         Mood and Affect: Mood normal          Behavior: Behavior normal          Thought Content:  Thought content normal          Judgment: Judgment normal          Pertinent Laboratory/Diagnostic Studies:  Lab Results   Component Value Date    GLUCOSE 94 08/13/2015    BUN 17 10/14/2022    CREATININE 0 89 10/14/2022    CALCIUM 8 7 10/14/2022     08/13/2015    K 4 3 10/14/2022    CO2 26 10/14/2022     (H) 10/14/2022     Lab Results   Component Value Date    ALT 26 10/14/2022    AST 13 10/14/2022    ALKPHOS 99 10/14/2022    BILITOT 0 53 08/13/2015       Lab Results   Component Value Date    WBC 6 96 10/14/2022    HGB 12 2 10/14/2022    HCT 38 2 10/14/2022    MCV 91 10/14/2022     10/14/2022       No results found for: TSH    Lab Results   Component Value Date    CHOL 200 08/13/2015     Lab Results   Component Value Date    TRIG 83 10/14/2022     Lab Results   Component Value Date    HDL 64 10/14/2022     Lab Results   Component Value Date [facility] 98 10/14/2022     No results found for: HGBA1C    Results for orders placed or performed in visit on 11/01/22   T4, free   Result Value Ref Range    Free T4 1 46 0 76 - 1 46 ng/dL   Thyroglobulin   Result Value Ref Range    Thyroglobulin Ab <1 0 0 0 - 0 9 IU/mL   Anti-microsomal antibody   Result Value Ref Range    THYROID MICROSOMAL ANTIBODY <8 0 - 34 IU/mL   Anti-thyroglobulin antibody   Result Value Ref Range    Thyroglobulin Ab <1 0 0 0 - 0 9 IU/mL   Thyroglobulin by ARMAAN   Result Value Ref Range    Thyroglobulin-ARMAAN <0 1 (L) 1 5 - 38 5 ng/mL       Orders Placed This Encounter   Procedures   • XR chest pa & lateral   • POCT ECG       ALLERGIES:  No Known Allergies    Current Medications     Current Outpatient Medications   Medication Sig Dispense Refill   • amoxicillin (AMOXIL) 500 mg capsule TAKE 4 CAPSULES 1 HOUR PRIOR TO DENTAL TREATMENT     • Fluticasone-Salmeterol,sensor, (AirDuo Digihaler) 55-14 MCG/ACT AEPB Inhale 2 puffs 2 (two) times a day Rinse mouth after use  1 each 0   • Glucosamine-Chondroit-Vit C-Mn (TH GLUCOSAMINE-CHONDROITIN) CAPS Take by mouth     • levothyroxine 100 mcg tablet TAKE 1 TABLET DAILY 90 tablet 3   • losartan (COZAAR) 100 MG tablet TAKE 1 TABLET DAILY 90 tablet 0   • MULTIPLE VITAMINS PO Take by mouth     • Omega-3 Fatty Acids (FISH OIL) 1,000 mg Take by mouth     • pravastatin (PRAVACHOL) 40 mg tablet TAKE 1 TABLET DAILY 90 tablet 0   • Red Yeast Rice 600 MG TABS Take by mouth     • predniSONE 20 mg tablet Take 1 tablet (20 mg total) by mouth daily (Patient not taking: Reported on 2/8/2023) 5 tablet 0     No current facility-administered medications for this visit           Health Maintenance     Health Maintenance   Topic Date Due   • Hepatitis C Screening  Never done   • [facility] PLAN OF CARE  Never done   • Osteoporosis Screening  Never done   • Breast Cancer Screening: Mammogram  06/15/2022   • BMI: Followup Plan  10/04/2022   • COVID-19 Vaccine (4 - Booster for [employer] series) 12/19/2022   • Fall Risk  10/05/2023   • Depression Screening  10/05/2023   • Urinary Incontinence Screening  10/05/2023   • Medicare Annual Wellness Visit (AWV)  10/05/2023   • Colorectal Cancer Screening  10/07/2023   • BMI: Adult  02/08/2024   • Pneumococcal Vaccine: 65+ Years  Completed   • Influenza Vaccine  Completed   • HIB Vaccine  Aged Out   • IPV Vaccine  Aged Out   • Hepatitis A Vaccine  Aged Out   • Meningococcal ACWY Vaccine  Aged Out   • HPV Vaccine  Aged Out     Immunization History   Administered Date(s) Administered   • COVID-19 PFIZER VACCINE 0 3 ML IM 02/03/2021, 02/24/2021, 10/30/2021   • COVID-19 Pfizer Vac BIVALENT Darryl-sucrose 12 Yr+ IM (BOOSTER ONLY) 10/24/2022   • INFLUENZA 10/26/2018, 10/25/2019, 10/05/2022   • Influenza Split High Dose Preservative Free IM 10/25/2019   • Influenza, high dose seasonal 0 7 mL 10/01/2020, 10/04/2021, 10/05/2022   • Influenza, seasonal, injectable, preservative free 10/25/2019   • Pneumococcal Conjugate 13-Valent 04/12/2017   • Pneumococcal Polysaccharide PPV23 05/29/2018   • Tdap 11/12/2019   • Zoster 10/86/9331       Matthew Soliman MD

## 2023-02-13 ENCOUNTER — TELEPHONE (OUTPATIENT)
Dept: FAMILY MEDICINE CLINIC | Facility: CLINIC | Age: 73
End: 2023-02-13

## 2023-02-13 NOTE — TELEPHONE ENCOUNTER
----- Message from Ofelia Villarreal MD sent at 1/57/5692  6:06 AM EST -----  Chest x-ray was read as normal

## 2023-02-28 DIAGNOSIS — E78.5 HYPERLIPIDEMIA, UNSPECIFIED HYPERLIPIDEMIA TYPE: ICD-10-CM

## 2023-02-28 DIAGNOSIS — I10 ESSENTIAL HYPERTENSION: ICD-10-CM

## 2023-02-28 RX ORDER — LOSARTAN POTASSIUM 100 MG/1
TABLET ORAL
Qty: 90 TABLET | Refills: 0 | Status: SHIPPED | OUTPATIENT
Start: 2023-02-28

## 2023-02-28 RX ORDER — PRAVASTATIN SODIUM 40 MG
TABLET ORAL
Qty: 90 TABLET | Refills: 0 | Status: SHIPPED | OUTPATIENT
Start: 2023-02-28

## 2023-04-09 PROBLEM — R05.9 COUGH: Status: RESOLVED | Noted: 2023-02-08 | Resolved: 2023-04-09

## 2023-05-26 ENCOUNTER — HOSPITAL ENCOUNTER (OUTPATIENT)
Dept: MAMMOGRAPHY | Facility: CLINIC | Age: 73
Discharge: HOME | End: 2023-05-26

## 2023-05-26 VITALS — WEIGHT: 174 LBS | BODY MASS INDEX: 32.02 KG/M2 | HEIGHT: 62 IN

## 2023-05-26 DIAGNOSIS — Z12.31 ENCOUNTER FOR SCREENING MAMMOGRAM FOR MALIGNANT NEOPLASM OF BREAST: ICD-10-CM

## 2023-05-26 DIAGNOSIS — Z00.00 PERIODIC HEALTH ASSESSMENT, GENERAL SCREENING, ADULT: ICD-10-CM

## 2023-05-30 ENCOUNTER — TELEPHONE (OUTPATIENT)
Dept: FAMILY MEDICINE CLINIC | Facility: CLINIC | Age: 73
End: 2023-05-30

## 2023-05-30 NOTE — TELEPHONE ENCOUNTER
----- Message from Delia Doe MD sent at 4/81/3342 10:33 AM EDT -----  Recent mammogram stable for patient this year

## 2023-06-07 DIAGNOSIS — E78.5 HYPERLIPIDEMIA, UNSPECIFIED HYPERLIPIDEMIA TYPE: ICD-10-CM

## 2023-06-07 DIAGNOSIS — I10 ESSENTIAL HYPERTENSION: ICD-10-CM

## 2023-06-07 RX ORDER — PRAVASTATIN SODIUM 40 MG
TABLET ORAL
Qty: 90 TABLET | Refills: 0 | Status: SHIPPED | OUTPATIENT
Start: 2023-06-07

## 2023-06-07 RX ORDER — LOSARTAN POTASSIUM 100 MG/1
TABLET ORAL
Qty: 90 TABLET | Refills: 0 | Status: SHIPPED | OUTPATIENT
Start: 2023-06-07

## 2023-09-15 DIAGNOSIS — E78.5 HYPERLIPIDEMIA, UNSPECIFIED HYPERLIPIDEMIA TYPE: ICD-10-CM

## 2023-09-15 DIAGNOSIS — Z85.850 HISTORY OF THYROID CANCER: ICD-10-CM

## 2023-09-15 DIAGNOSIS — I10 ESSENTIAL HYPERTENSION: ICD-10-CM

## 2023-09-15 RX ORDER — LOSARTAN POTASSIUM 100 MG/1
TABLET ORAL
Qty: 90 TABLET | Refills: 0 | Status: SHIPPED | OUTPATIENT
Start: 2023-09-15

## 2023-09-15 RX ORDER — PRAVASTATIN SODIUM 40 MG
TABLET ORAL
Qty: 90 TABLET | Refills: 0 | Status: SHIPPED | OUTPATIENT
Start: 2023-09-15

## 2023-09-18 DIAGNOSIS — Z85.850 HISTORY OF THYROID CANCER: Primary | ICD-10-CM

## 2023-09-18 RX ORDER — LEVOTHYROXINE SODIUM 0.1 MG/1
100 TABLET ORAL
Qty: 90 TABLET | Refills: 3 | Status: SHIPPED | OUTPATIENT
Start: 2023-09-18

## 2023-09-18 RX ORDER — LEVOTHYROXINE SODIUM 0.1 MG/1
TABLET ORAL
Qty: 90 TABLET | Refills: 3 | Status: SHIPPED | OUTPATIENT
Start: 2023-09-18

## 2023-09-29 ENCOUNTER — RA CDI HCC (OUTPATIENT)
Dept: OTHER | Facility: HOSPITAL | Age: 73
End: 2023-09-29

## 2023-10-06 ENCOUNTER — OFFICE VISIT (OUTPATIENT)
Dept: FAMILY MEDICINE CLINIC | Facility: CLINIC | Age: 73
End: 2023-10-06
Payer: MEDICARE

## 2023-10-06 VITALS
SYSTOLIC BLOOD PRESSURE: 138 MMHG | DIASTOLIC BLOOD PRESSURE: 68 MMHG | TEMPERATURE: 98 F | HEART RATE: 100 BPM | RESPIRATION RATE: 18 BRPM | BODY MASS INDEX: 31.83 KG/M2 | OXYGEN SATURATION: 98 % | WEIGHT: 173 LBS | HEIGHT: 62 IN

## 2023-10-06 DIAGNOSIS — E05.90 SUBCLINICAL HYPERTHYROIDISM: ICD-10-CM

## 2023-10-06 DIAGNOSIS — Z23 FLU VACCINE NEED: Primary | ICD-10-CM

## 2023-10-06 DIAGNOSIS — I10 BENIGN ESSENTIAL HYPERTENSION: ICD-10-CM

## 2023-10-06 DIAGNOSIS — C73 CARCINOMA OF THYROID (HCC): ICD-10-CM

## 2023-10-06 DIAGNOSIS — E78.5 HYPERLIPIDEMIA, UNSPECIFIED HYPERLIPIDEMIA TYPE: ICD-10-CM

## 2023-10-06 DIAGNOSIS — Z12.11 COLON CANCER SCREENING: ICD-10-CM

## 2023-10-06 DIAGNOSIS — Z00.00 MEDICARE ANNUAL WELLNESS VISIT, SUBSEQUENT: ICD-10-CM

## 2023-10-06 PROBLEM — J06.9 UPPER RESPIRATORY TRACT INFECTION: Status: RESOLVED | Noted: 2023-01-20 | Resolved: 2023-10-06

## 2023-10-06 PROCEDURE — G0439 PPPS, SUBSEQ VISIT: HCPCS | Performed by: FAMILY MEDICINE

## 2023-10-06 PROCEDURE — 90662 IIV NO PRSV INCREASED AG IM: CPT

## 2023-10-06 PROCEDURE — G0008 ADMIN INFLUENZA VIRUS VAC: HCPCS

## 2023-10-06 PROCEDURE — 99213 OFFICE O/P EST LOW 20 MIN: CPT | Performed by: FAMILY MEDICINE

## 2023-10-06 NOTE — ASSESSMENT & PLAN NOTE
History of thyroid cancer. Patient sees her general surgeon for monitoring of this and condition.   She will obtain thyroid function test as per their we will make further recommendations pending results of test.

## 2023-10-06 NOTE — PATIENT INSTRUCTIONS
Medicare Preventive Visit Patient Instructions  Thank you for completing your Welcome to Medicare Visit or Medicare Annual Wellness Visit today. Your next wellness visit will be due in one year (10/6/2024). The screening/preventive services that you may require over the next 5-10 years are detailed below. Some tests may not apply to you based off risk factors and/or age. Screening tests ordered at today's visit but not completed yet may show as past due. Also, please note that scanned in results may not display below. Preventive Screenings:  Service Recommendations Previous Testing/Comments   Colorectal Cancer Screening  * Colonoscopy    * Fecal Occult Blood Test (FOBT)/Fecal Immunochemical Test (FIT)  * Fecal DNA/Cologuard Test  * Flexible Sigmoidoscopy Age: 43-73 years old   Colonoscopy: every 10 years (may be performed more frequently if at higher risk)  OR  FOBT/FIT: every 1 year  OR  Cologuard: every 3 years  OR  Sigmoidoscopy: every 5 years  Screening may be recommended earlier than age 39 if at higher risk for colorectal cancer. Also, an individualized decision between you and your healthcare provider will decide whether screening between the ages of 77-80 would be appropriate. Colonoscopy: 10/07/2020  FOBT/FIT: Not on file  Cologuard: 10/07/2020  Sigmoidoscopy: Not on file    Screening Current     Breast Cancer Screening Age: 36 years old  Frequency: every 1-2 years  Not required if history of left and right mastectomy Mammogram: 05/26/2023    Screening Current   Cervical Cancer Screening Between the ages of 21-29, pap smear recommended once every 3 years. Between the ages of 32-69, can perform pap smear with HPV co-testing every 5 years.    Recommendations may differ for women with a history of total hysterectomy, cervical cancer, or abnormal pap smears in past. Pap Smear: 07/09/2020    Screening Not Indicated   Hepatitis C Screening Once for adults born between 1945 and 1965  More frequently in patients at high risk for Hepatitis C Hep C Antibody: Not on file        Diabetes Screening 1-2 times per year if you're at risk for diabetes or have pre-diabetes Fasting glucose: 100 mg/dL (10/14/2022)  A1C: No results in last 5 years (No results in last 5 years)  Screening Current   Cholesterol Screening Once every 5 years if you don't have a lipid disorder. May order more often based on risk factors. Lipid panel: 10/14/2022    Screening Not Indicated  History Lipid Disorder     Other Preventive Screenings Covered by Medicare:  1. Abdominal Aortic Aneurysm (AAA) Screening: covered once if your at risk. You're considered to be at risk if you have a family history of AAA. 2. Lung Cancer Screening: covers low dose CT scan once per year if you meet all of the following conditions: (1) Age 48-67; (2) No signs or symptoms of lung cancer; (3) Current smoker or have quit smoking within the last 15 years; (4) You have a tobacco smoking history of at least 20 pack years (packs per day multiplied by number of years you smoked); (5) You get a written order from a healthcare provider. 3. Glaucoma Screening: covered annually if you're considered high risk: (1) You have diabetes OR (2) Family history of glaucoma OR (3)  aged 48 and older OR (3)  American aged 72 and older  3. Osteoporosis Screening: covered every 2 years if you meet one of the following conditions: (1) You're estrogen deficient and at risk for osteoporosis based off medical history and other findings; (2) Have a vertebral abnormality; (3) On glucocorticoid therapy for more than 3 months; (4) Have primary hyperparathyroidism; (5) On osteoporosis medications and need to assess response to drug therapy. · Last bone density test (DXA Scan): Not on file. 5. HIV Screening: covered annually if you're between the age of 14-79. Also covered annually if you are younger than 13 and older than 72 with risk factors for HIV infection.  For pregnant patients, it is covered up to 3 times per pregnancy. Immunizations:  Immunization Recommendations   Influenza Vaccine Annual influenza vaccination during flu season is recommended for all persons aged >= 6 months who do not have contraindications   Pneumococcal Vaccine   * Pneumococcal conjugate vaccine = PCV13 (Prevnar 13), PCV15 (Vaxneuvance), PCV20 (Prevnar 20)  * Pneumococcal polysaccharide vaccine = PPSV23 (Pneumovax) Adults 20-63 years old: 1-3 doses may be recommended based on certain risk factors  Adults 72 years old: 1-2 doses may be recommended based off what pneumonia vaccine you previously received   Hepatitis B Vaccine 3 dose series if at intermediate or high risk (ex: diabetes, end stage renal disease, liver disease)   Tetanus (Td) Vaccine - COST NOT COVERED BY MEDICARE PART B Following completion of primary series, a booster dose should be given every 10 years to maintain immunity against tetanus. Td may also be given as tetanus wound prophylaxis. Tdap Vaccine - COST NOT COVERED BY MEDICARE PART B Recommended at least once for all adults. For pregnant patients, recommended with each pregnancy. Shingles Vaccine (Shingrix) - COST NOT COVERED BY MEDICARE PART B  2 shot series recommended in those aged 48 and above     Health Maintenance Due:      Topic Date Due   • Hepatitis C Screening  Never done   • Colorectal Cancer Screening  10/07/2023   • Breast Cancer Screening: Mammogram  05/26/2025     Immunizations Due:      Topic Date Due   • COVID-19 Vaccine (5 - Pfizer series) 02/24/2023   • Influenza Vaccine (1) 09/01/2023     Advance Directives   What are advance directives? Advance directives are legal documents that state your wishes and plans for medical care. These plans are made ahead of time in case you lose your ability to make decisions for yourself. Advance directives can apply to any medical decision, such as the treatments you want, and if you want to donate organs.    What are the types of advance directives? There are many types of advance directives, and each state has rules about how to use them. You may choose a combination of any of the following:  · Living will: This is a written record of the treatment you want. You can also choose which treatments you do not want, which to limit, and which to stop at a certain time. This includes surgery, medicine, IV fluid, and tube feedings. · Durable power of  for [facility]): This is a written record that states who you want to make healthcare choices for you when you are unable to make them for yourself. This person, called a proxy, is usually a family member or a friend. You may choose more than 1 proxy. · Do not resuscitate (DNR) order:  A DNR order is used in case your heart stops beating or you stop breathing. It is a request not to have certain forms of treatment, such as CPR. A DNR order may be included in other types of advance directives. · Medical directive: This covers the care that you want if you are in a coma, near death, or unable to make decisions for yourself. You can list the treatments you want for each condition. Treatment may include pain medicine, surgery, blood transfusions, dialysis, IV or tube feedings, and a ventilator (breathing machine). · Values history: This document has questions about your views, beliefs, and how you feel and think about life. This information can help others choose the care that you would choose. Why are advance directives important? An advance directive helps you control your care. Although spoken wishes may be used, it is better to have your wishes written down. Spoken wishes can be misunderstood, or not followed. Treatments may be given even if you do not want them. An advance directive may make it easier for your family to make difficult choices about your care.    Weight Management   Why it is important to manage your weight:  Being overweight increases your risk of health conditions such as heart disease, high blood pressure, type 2 diabetes, and certain types of cancer. It can also increase your risk for osteoarthritis, sleep apnea, and other respiratory problems. Aim for a slow, steady weight loss. Even a small amount of weight loss can lower your risk of health problems. How to lose weight safely:  A safe and healthy way to lose weight is to eat fewer calories and get regular exercise. You can lose up about 1 pound a week by decreasing the number of calories you eat by 500 calories each day. Healthy meal plan for weight management:  A healthy meal plan includes a variety of foods, contains fewer calories, and helps you stay healthy. A healthy meal plan includes the following:  · Eat whole-grain foods more often. A healthy meal plan should contain fiber. Fiber is the part of grains, fruits, and vegetables that is not broken down by your body. Whole-grain foods are healthy and provide extra fiber in your diet. Some examples of whole-grain foods are whole-wheat breads and pastas, oatmeal, brown rice, and bulgur. · Eat a variety of vegetables every day. Include dark, leafy greens such as spinach, kale, jimenez greens, and mustard greens. Eat yellow and orange vegetables such as carrots, sweet potatoes, and winter squash. · Eat a variety of fruits every day. Choose fresh or canned fruit (canned in its own juice or light syrup) instead of juice. Fruit juice has very little or no fiber. · Eat low-fat dairy foods. Drink fat-free (skim) milk or 1% milk. Eat fat-free yogurt and low-fat cottage cheese. Try low-fat cheeses such as mozzarella and other reduced-fat cheeses. · Choose meat and other protein foods that are low in fat. Choose beans or other legumes such as split peas or lentils. Choose fish, skinless poultry (chicken or turkey), or lean cuts of red meat (beef or pork). Before you cook meat or poultry, cut off any visible fat. · Use less fat and oil.   Try baking foods instead of frying them. Add less fat, such as margarine, sour cream, regular salad dressing and mayonnaise to foods. Eat fewer high-fat foods. Some examples of high-fat foods include french fries, doughnuts, ice cream, and cakes. · Eat fewer sweets. Limit foods and drinks that are high in sugar. This includes candy, cookies, regular soda, and sweetened drinks. Exercise:  Exercise at least 30 minutes per day on most days of the week. Some examples of exercise include walking, biking, dancing, and swimming. You can also fit in more physical activity by taking the stairs instead of the elevator or parking farther away from stores. Ask your healthcare provider about the best exercise plan for you. © Copyright [employer] 2018 Information is for End User's use only and may not be sold, redistributed or otherwise used for commercial purposes.  All illustrations and images included in CareNotes® are the copyrighted property of [employer]., Inc. or  [address]

## 2023-10-06 NOTE — PROGRESS NOTES
Assessment and Plan:     Problem List Items Addressed This Visit        Endocrine    Carcinoma of thyroid ([address])     History of thyroid cancer. Patient sees her general surgeon for monitoring of this and condition. She will obtain thyroid function test as per their we will make further recommendations pending results of test.         Subclinical hyperthyroidism    Relevant Orders    CBC    Comprehensive metabolic panel    Lipid panel       Cardiovascular and Mediastinum    Benign essential hypertension     Hypertension. The patient's blood pressure is stable at this time and he will continue current regimen of medications         Relevant Orders    CBC    Comprehensive metabolic panel    Lipid panel       Other    Hyperlipidemia     Hyperlipidemia. Patient will check lipid panel blood work and continue her current dose of statin therapy         Relevant Orders    CBC    Comprehensive metabolic panel    Lipid panel   Other Visit Diagnoses     Flu vaccine need    -  Primary    Relevant Orders    influenza vaccine, high-dose, PF 0.7 mL (FLUZONE HIGH-DOSE) (Completed)    Medicare annual wellness visit, subsequent        Colon cancer screening        Relevant Orders    Cologuard      Patient received annual influenza vaccine today     Preventive health issues were discussed with patient, and age appropriate screening tests were ordered as noted in patient's After Visit Summary. Personalized health advice and appropriate referrals for health education or preventive services given if needed, as noted in patient's After Visit Summary. History of Present Illness:     Patient presents for a Medicare Wellness Visit    Patient in the office to review chronic condition. She denies any recent illness. She continues to play golf at least once a week. She denies any changes in weight recently. Her mammogram is up-to-date. Patient is due for her follow-up Cologuard test which was performed 3 years ago.      Patient Care Team:  Yaa Shipman MD as PCP - Blair Doshi MD     Review of Systems:     Review of Systems   Constitutional: Negative. HENT: Negative. Eyes: Negative. Respiratory: Negative. Cardiovascular: Negative. Gastrointestinal: Negative. Genitourinary: Negative. Musculoskeletal: Negative. Skin: Negative. Neurological: Negative. Psychiatric/Behavioral: Negative.          Problem List:     Patient Active Problem List   Diagnosis   • Benign essential hypertension   • Carcinoma of thyroid ([address])   • Hyperlipidemia   • Subclinical hyperthyroidism   • History of thyroid cancer   • Tachycardia      Past Medical and Surgical History:     Past Medical History:   Diagnosis Date   • Carcinoma of thyroid ([address])     Last Assessed:  8/29/17     Past Surgical History:   Procedure Laterality Date   • DIAGNOSTIC LAPAROSCOPY     • EXPLORATORY LAPAROTOMY     • SALPINGOOPHORECTOMY Left    • TOTAL THYROIDECTOMY     • TUBAL LIGATION        Family History:     Family History   Problem Relation Age of Onset   • Colon cancer Mother 67   • Heart disease Father    • Other Father         Stroke syndrome   • Thyroid cancer Sister 50   • No Known Problems Sister    • No Known Problems Sister    • No Known Problems Sister    • No Known Problems Sister    • No Known Problems Sister    • No Known Problems Sister    • No Known Problems Daughter    • No Known Problems Maternal Grandmother    • No Known Problems Maternal Grandfather    • No Known Problems Paternal Grandmother    • No Known Problems Paternal Grandfather    • Colon cancer Brother         age dx unknown   • Brain cancer Son         passed as child   • No Known Problems Maternal Aunt    • No Known Problems Maternal Aunt    • No Known Problems Paternal Aunt    • No Known Problems Paternal Aunt       Social History:     Social History     Socioeconomic History   • Marital status: /Civil Union     Spouse name: None   • Number of children: None   • Years of education: None   • Highest education level: None   Occupational History   • Occupation: retired   Tobacco Use   • Smoking status: Never   • Smokeless tobacco: Never   Vaping Use   • Vaping Use: Never used   Substance and Sexual Activity   • Alcohol use: Yes     Comment: Social drinker   • Drug use: No   • Sexual activity: Yes     Partners: Male     Birth control/protection: Post-menopausal   Other Topics Concern   • None   Social History Narrative    Caffeine use    Uses safety equipment - seatbelts     Social Determinants of Health     Financial Resource Strain: Low Risk  (9/29/2023)    Overall Financial Resource Strain (CARDIA)    • Difficulty of Paying Living Expenses: Not hard at all   Food Insecurity: Not on file   Transportation Needs: No Transportation Needs (9/29/2023)    PRAPARE - Transportation    • Lack of Transportation (Medical): No    • Lack of Transportation (Non-Medical): No   Physical Activity: Not on file   Stress: Not on file   Social Connections: Not on file   Intimate Partner Violence: Not on file   Housing Stability: Not on file      Medications and Allergies:     Current Outpatient Medications   Medication Sig Dispense Refill   • amoxicillin (AMOXIL) 500 mg capsule TAKE 4 CAPSULES 1 HOUR PRIOR TO DENTAL TREATMENT     • Glucosamine-Chondroit-Vit C-Mn (TH GLUCOSAMINE-CHONDROITIN) CAPS Take by mouth     • levothyroxine (Euthyrox) 100 mcg tablet Take 1 tablet (100 mcg total) by mouth daily in the early morning 90 tablet 3   • losartan (COZAAR) 100 MG tablet TAKE 1 TABLET DAILY 90 tablet 0   • MULTIPLE VITAMINS PO Take by mouth     • Omega-3 Fatty Acids (FISH OIL) 1,000 mg Take by mouth     • pravastatin (PRAVACHOL) 40 mg tablet TAKE 1 TABLET DAILY 90 tablet 0   • Red Yeast Rice 600 MG TABS Take by mouth       No current facility-administered medications for this visit.      No Known Allergies   Immunizations:     Immunization History   Administered Date(s) Administered   • COVID-19 PFIZER VACCINE 0.3 ML IM 02/03/2021, 02/24/2021, 10/30/2021   • COVID-19 Pfizer Vac BIVALENT Darryl-sucrose 12 Yr+ IM 10/24/2022   • INFLUENZA 10/26/2018, 10/25/2019, 10/05/2022   • Influenza Split High Dose Preservative Free IM 10/25/2019   • Influenza, high dose seasonal 0.7 mL 10/01/2020, 10/04/2021, 10/05/2022, 10/06/2023   • Influenza, seasonal, injectable, preservative free 10/25/2019   • Pneumococcal Conjugate 13-Valent 04/12/2017   • Pneumococcal Polysaccharide PPV23 05/29/2018   • Tdap 11/12/2019   • Zoster 10/31/2016      Health Maintenance:         Topic Date Due   • Hepatitis C Screening  Never done   • Colorectal Cancer Screening  10/07/2023   • Breast Cancer Screening: Mammogram  05/26/2025         Topic Date Due   • COVID-19 Vaccine (5 - Pfizer series) 02/24/2023      Medicare Screening Tests and Risk Assessments:         Health Risk Assessment:   Patient rates overall health as very good. Patient feels that their physical health rating is same. Patient is very satisfied with their life. Eyesight was rated as same. Hearing was rated as same. Patient feels that their emotional and mental health rating is same. Patients states they are never, rarely angry. Patient states they are never, rarely unusually tired/fatigued. Pain experienced in the last 7 days has been none. Patient states that she has experienced no weight loss or gain in last 6 months. Depression Screening:   PHQ-2 Score: 0      Fall Risk Screening: In the past year, patient has experienced: no history of falling in past year      Urinary Incontinence Screening:   Patient has not leaked urine accidently in the last six months. Home Safety:  Patient does not have trouble with stairs inside or outside of their home. Patient has working smoke alarms and has no working carbon monoxide detector. Home safety hazards include: none. Nutrition:   Current diet is Low Saturated Fat, Low Carb, No Added Salt and Limited junk food.      Medications: Patient is not currently taking any over-the-counter supplements. Patient is able to manage medications. Activities of Daily Living (ADLs)/Instrumental Activities of Daily Living (IADLs):   Walk and transfer into and out of bed and chair?: Yes  Dress and groom yourself?: Yes    Bathe or shower yourself?: Yes    Feed yourself? Yes  Do your laundry/housekeeping?: Yes  Manage your money, pay your bills and track your expenses?: Yes  Make your own meals?: Yes    Do your own shopping?: Yes    Durable Medical Equipment Suppliers  None    Previous Hospitalizations:   Any hospitalizations or ED visits within the last 12 months?: No      Advance Care Planning:   Living will: Yes    Durable POA for healthcare: Yes    Advanced directive: Yes      PREVENTIVE SCREENINGS      Cardiovascular Screening:    General: History Lipid Disorder and Screening Current      Diabetes Screening:     General: Screening Current      Colorectal Cancer Screening:     General: Screening Current      Breast Cancer Screening:     General: Screening Current      Cervical Cancer Screening:    General: Screening Not Indicated      Lung Cancer Screening:     General: Screening Not Indicated    Screening, Brief Intervention, and Referral to Treatment (SBIRT)    Screening  Typical number of drinks in a day: 0  Typical number of drinks in a week: 0  Interpretation: Low risk drinking behavior. AUDIT-C Screenin) How often did you have a drink containing alcohol in the past year? 2 to 4 times a month  2) How many drinks did you have on a typical day when you were drinking in the past year?  1 to 2  3) How often did you have 6 or more drinks on one occasion in the past year? never    AUDIT-C Score: 2  Interpretation: Score 0-2 (female): Negative screen for alcohol misuse    Single Item Drug Screening:  How often have you used an illegal drug (including marijuana) or a prescription medication for non-medical reasons in the past year? never    Single Item Drug Screen Score: 0  Interpretation: Negative screen for possible drug use disorder    No results found. Physical Exam:     /68   Pulse 100   Temp 98 °F (36.7 °C)   Resp 18   Ht 5' 2" (1.575 m)   Wt 78.5 kg (173 lb)   LMP  (LMP Unknown)   SpO2 98%   BMI 31.64 kg/m²     Physical Exam  Vitals and nursing note reviewed. Constitutional:       General: She is not in acute distress. Appearance: Normal appearance. She is well-developed. She is not ill-appearing. HENT:      Head: Normocephalic and atraumatic. Eyes:      General:         Right eye: No discharge. Left eye: No discharge. Extraocular Movements: Extraocular movements intact. Conjunctiva/sclera: Conjunctivae normal.      Pupils: Pupils are equal, round, and reactive to light. Neck:      Vascular: No carotid bruit. Cardiovascular:      Rate and Rhythm: Normal rate and regular rhythm. Heart sounds: No murmur heard. Pulmonary:      Effort: Pulmonary effort is normal. No respiratory distress. Breath sounds: Normal breath sounds. Abdominal:      General: Abdomen is flat. Bowel sounds are normal.      Palpations: Abdomen is soft. Tenderness: There is no abdominal tenderness. There is no guarding or rebound. Musculoskeletal:      Right lower leg: No edema. Left lower leg: No edema. Lymphadenopathy:      Cervical: No cervical adenopathy. Skin:     General: Skin is warm and dry. Capillary Refill: Capillary refill takes less than 2 seconds. Neurological:      Mental Status: She is alert. Mental status is at baseline. Psychiatric:         Mood and Affect: Mood normal.         Behavior: Behavior normal.         Thought Content:  Thought content normal.         Judgment: Judgment normal.          Marie Rodriguez MD

## 2023-10-06 NOTE — ASSESSMENT & PLAN NOTE
Hyperlipidemia.   Patient will check lipid panel blood work and continue her current dose of statin therapy

## 2023-10-12 ENCOUNTER — APPOINTMENT (OUTPATIENT)
Dept: LAB | Facility: CLINIC | Age: 73
End: 2023-10-12
Payer: MEDICARE

## 2023-10-12 ENCOUNTER — TRANSCRIBE ORDERS (OUTPATIENT)
Dept: LAB | Facility: CLINIC | Age: 73
End: 2023-10-12

## 2023-10-12 DIAGNOSIS — Z85.850 PERSONAL HISTORY OF MALIGNANT NEOPLASM OF THYROID: Primary | ICD-10-CM

## 2023-10-12 DIAGNOSIS — Z85.850 PERSONAL HISTORY OF MALIGNANT NEOPLASM OF THYROID: ICD-10-CM

## 2023-10-12 DIAGNOSIS — E78.5 HYPERLIPIDEMIA, UNSPECIFIED HYPERLIPIDEMIA TYPE: ICD-10-CM

## 2023-10-12 DIAGNOSIS — I10 BENIGN ESSENTIAL HYPERTENSION: ICD-10-CM

## 2023-10-12 DIAGNOSIS — E05.90 SUBCLINICAL HYPERTHYROIDISM: ICD-10-CM

## 2023-10-12 LAB
ALBUMIN SERPL BCP-MCNC: 4.2 G/DL (ref 3.5–5)
ALP SERPL-CCNC: 86 U/L (ref 34–104)
ALT SERPL W P-5'-P-CCNC: 21 U/L (ref 7–52)
ANION GAP SERPL CALCULATED.3IONS-SCNC: 9 MMOL/L
AST SERPL W P-5'-P-CCNC: 18 U/L (ref 13–39)
BILIRUB SERPL-MCNC: 0.4 MG/DL (ref 0.2–1)
BUN SERPL-MCNC: 27 MG/DL (ref 5–25)
CALCIUM SERPL-MCNC: 8.8 MG/DL (ref 8.4–10.2)
CHLORIDE SERPL-SCNC: 108 MMOL/L (ref 96–108)
CHOLEST SERPL-MCNC: 184 MG/DL
CO2 SERPL-SCNC: 26 MMOL/L (ref 21–32)
CREAT SERPL-MCNC: 0.84 MG/DL (ref 0.6–1.3)
ERYTHROCYTE [DISTWIDTH] IN BLOOD BY AUTOMATED COUNT: 12.7 % (ref 11.6–15.1)
GFR SERPL CREATININE-BSD FRML MDRD: 69 ML/MIN/1.73SQ M
GLUCOSE P FAST SERPL-MCNC: 88 MG/DL (ref 65–99)
HCT VFR BLD AUTO: 39 % (ref 34.8–46.1)
HDLC SERPL-MCNC: 63 MG/DL
HGB BLD-MCNC: 12.5 G/DL (ref 11.5–15.4)
LDLC SERPL CALC-MCNC: 105 MG/DL (ref 0–100)
MCH RBC QN AUTO: 29.7 PG (ref 26.8–34.3)
MCHC RBC AUTO-ENTMCNC: 32.1 G/DL (ref 31.4–37.4)
MCV RBC AUTO: 93 FL (ref 82–98)
NONHDLC SERPL-MCNC: 121 MG/DL
PLATELET # BLD AUTO: 299 THOUSANDS/UL (ref 149–390)
PMV BLD AUTO: 10.4 FL (ref 8.9–12.7)
POTASSIUM SERPL-SCNC: 4.4 MMOL/L (ref 3.5–5.3)
PROT SERPL-MCNC: 7.2 G/DL (ref 6.4–8.4)
RBC # BLD AUTO: 4.21 MILLION/UL (ref 3.81–5.12)
SODIUM SERPL-SCNC: 143 MMOL/L (ref 135–147)
TRIGL SERPL-MCNC: 78 MG/DL
WBC # BLD AUTO: 6.39 THOUSAND/UL (ref 4.31–10.16)

## 2023-10-12 PROCEDURE — 80053 COMPREHEN METABOLIC PANEL: CPT

## 2023-10-12 PROCEDURE — 86376 MICROSOMAL ANTIBODY EACH: CPT

## 2023-10-12 PROCEDURE — 36415 COLL VENOUS BLD VENIPUNCTURE: CPT

## 2023-10-12 PROCEDURE — 86800 THYROGLOBULIN ANTIBODY: CPT

## 2023-10-12 PROCEDURE — 85027 COMPLETE CBC AUTOMATED: CPT

## 2023-10-12 PROCEDURE — 80061 LIPID PANEL: CPT

## 2023-10-13 LAB
THYROGLOB AB SERPL-ACNC: <1 IU/ML (ref 0–0.9)
THYROPEROXIDASE AB SERPL-ACNC: 14 IU/ML (ref 0–34)

## 2023-10-18 LAB — COLOGUARD RESULT REPORTABLE: NEGATIVE

## 2023-10-25 ENCOUNTER — OFFICE VISIT (OUTPATIENT)
Dept: SURGERY | Facility: CLINIC | Age: 73
End: 2023-10-25
Payer: MEDICARE

## 2023-10-25 VITALS
DIASTOLIC BLOOD PRESSURE: 80 MMHG | SYSTOLIC BLOOD PRESSURE: 141 MMHG | TEMPERATURE: 98.2 F | WEIGHT: 174.8 LBS | HEART RATE: 108 BPM | RESPIRATION RATE: 12 BRPM | OXYGEN SATURATION: 98 % | HEIGHT: 62 IN | BODY MASS INDEX: 32.17 KG/M2

## 2023-10-25 DIAGNOSIS — Z85.850 HISTORY OF THYROID CANCER: Primary | ICD-10-CM

## 2023-10-25 PROCEDURE — 99213 OFFICE O/P EST LOW 20 MIN: CPT | Performed by: SURGERY

## 2023-10-25 NOTE — PROGRESS NOTES
Office Visit - General Surgery  Siena Lanza MRN: [id number]  Encounter: [id number]    Assessment and Plan  Problem List Items Addressed This Visit        Other    History of thyroid cancer - Primary     Doing well with no evidence of disease. We will check labs. See back 1 year. Relevant Orders    T3, free    TSH, 3rd generation with Free T4 reflex    T4, free    Thyroglobulin Panel       Chief Complaint:  Siena Lanza is a 68 y.o. female who presents for Thyroid Problem (Yearly thyroid check.)    Subjective  79-year-old female status post thyroidectomy for cancer 18 years ago. Doing well with no new complaints or problems. .  No compressive symptoms.   Seems to be doing well on the current dose of thyroid medication    Past Medical History:   Diagnosis Date   • Carcinoma of thyroid ([address])     Last Assessed:  8/29/17       Past Surgical History:   Procedure Laterality Date   • DIAGNOSTIC LAPAROSCOPY     • EXPLORATORY LAPAROTOMY     • SALPINGOOPHORECTOMY Left    • TOTAL THYROIDECTOMY     • TUBAL LIGATION         Family History   Problem Relation Age of Onset   • Colon cancer Mother 67   • Heart disease Father    • Other Father         Stroke syndrome   • Thyroid cancer Sister 50   • No Known Problems Sister    • No Known Problems Sister    • No Known Problems Sister    • No Known Problems Sister    • No Known Problems Sister    • No Known Problems Sister    • No Known Problems Daughter    • No Known Problems Maternal Grandmother    • No Known Problems Maternal Grandfather    • No Known Problems Paternal Grandmother    • No Known Problems Paternal Grandfather    • Colon cancer Brother         age dx unknown   • Brain cancer Son         passed as child   • No Known Problems Maternal Aunt    • No Known Problems Maternal Aunt    • No Known Problems Paternal Aunt    • No Known Problems Paternal Aunt        Social History     Tobacco Use   • Smoking status: Never   • Smokeless tobacco: Never Vaping Use   • Vaping Use: Never used   Substance Use Topics   • Alcohol use: Yes     Comment: Social drinker   • Drug use: No        Medications  Current Outpatient Medications on File Prior to Visit   Medication Sig Dispense Refill   • amoxicillin (AMOXIL) 500 mg capsule TAKE 4 CAPSULES 1 HOUR PRIOR TO DENTAL TREATMENT     • Glucosamine-Chondroit-Vit C-Mn (TH GLUCOSAMINE-CHONDROITIN) CAPS Take by mouth     • levothyroxine (Euthyrox) 100 mcg tablet Take 1 tablet (100 mcg total) by mouth daily in the early morning 90 tablet 3   • losartan (COZAAR) 100 MG tablet TAKE 1 TABLET DAILY 90 tablet 0   • MULTIPLE VITAMINS PO Take by mouth     • Omega-3 Fatty Acids (FISH OIL) 1,000 mg Take by mouth     • pravastatin (PRAVACHOL) 40 mg tablet TAKE 1 TABLET DAILY 90 tablet 0   • Red Yeast Rice 600 MG TABS Take by mouth       No current facility-administered medications on file prior to visit. Allergies  No Known Allergies    Review of Systems   Constitutional:  Negative for chills and fever. HENT:  Negative for ear pain and sore throat. Eyes:  Negative for pain and visual disturbance. Respiratory:  Negative for cough and shortness of breath. Cardiovascular:  Negative for chest pain and palpitations. Gastrointestinal:  Negative for abdominal pain and vomiting. Genitourinary:  Negative for dysuria and hematuria. Musculoskeletal:  Negative for arthralgias and back pain. Skin:  Negative for color change and rash. Neurological:  Negative for seizures and syncope. All other systems reviewed and are negative. Objective  Vitals:    10/25/23 0755   BP: 141/80   Pulse: (!) 108   Resp: 12   Temp: 98.2 °F (36.8 °C)   SpO2: 98%       Physical Exam  Vitals and nursing note reviewed. Constitutional:       General: She is not in acute distress. Appearance: She is well-developed. She is not diaphoretic. HENT:      Head: Normocephalic and atraumatic.       Right Ear: External ear normal.      Left Ear: External ear normal.   Eyes:      General: No scleral icterus. Conjunctiva/sclera: Conjunctivae normal.   Neck:      Thyroid: No thyromegaly. Trachea: No tracheal deviation. Comments: Anterior neck low well-healed transverse incision, no nodularity anywhere in the thyroid bed. No nodes appreciated  Cardiovascular:      Rate and Rhythm: Normal rate and regular rhythm. Heart sounds: Normal heart sounds. No murmur heard. No friction rub. Pulmonary:      Effort: Pulmonary effort is normal. No respiratory distress. Breath sounds: Normal breath sounds. No wheezing or rales. Abdominal:      General: There is no distension. Palpations: Abdomen is soft. There is no mass. Tenderness: There is no abdominal tenderness. There is no guarding or rebound. Musculoskeletal:         General: Normal range of motion. Cervical back: Neck supple. Lymphadenopathy:      Cervical: No cervical adenopathy. Skin:     General: Skin is warm and dry. Neurological:      Mental Status: She is alert and oriented to person, place, and time. Psychiatric:         Behavior: Behavior normal.         Thought Content:  Thought content normal.         Judgment: Judgment normal.

## 2023-10-30 ENCOUNTER — APPOINTMENT (OUTPATIENT)
Dept: LAB | Facility: CLINIC | Age: 73
End: 2023-10-30
Payer: MEDICARE

## 2023-10-30 DIAGNOSIS — Z85.850 PERSONAL HISTORY OF MALIGNANT NEOPLASM OF THYROID: ICD-10-CM

## 2023-10-30 DIAGNOSIS — Z85.850 HISTORY OF THYROID CANCER: ICD-10-CM

## 2023-10-30 LAB
T3FREE SERPL-MCNC: 3.42 PG/ML (ref 2.5–3.9)
T4 FREE SERPL-MCNC: 1.62 NG/DL (ref 0.61–1.12)
T4 SERPL-MCNC: 10.86 UG/DL (ref 6.09–12.23)
TSH SERPL DL<=0.05 MIU/L-ACNC: <0.01 UIU/ML (ref 0.45–4.5)

## 2023-10-30 PROCEDURE — 84432 ASSAY OF THYROGLOBULIN: CPT

## 2023-10-30 PROCEDURE — 84443 ASSAY THYROID STIM HORMONE: CPT

## 2023-10-30 PROCEDURE — 84479 ASSAY OF THYROID (T3 OR T4): CPT

## 2023-10-30 PROCEDURE — 36415 COLL VENOUS BLD VENIPUNCTURE: CPT

## 2023-10-30 PROCEDURE — 84439 ASSAY OF FREE THYROXINE: CPT

## 2023-10-30 PROCEDURE — 86800 THYROGLOBULIN ANTIBODY: CPT

## 2023-10-30 PROCEDURE — 84481 FREE ASSAY (FT-3): CPT

## 2023-10-31 DIAGNOSIS — C73 CARCINOMA OF THYROID (HCC): Primary | ICD-10-CM

## 2023-10-31 LAB
T3RU NFR SERPL: 33 % (ref 24–39)
THYROGLOB AB SERPL-ACNC: <1 IU/ML (ref 0–0.9)
THYROGLOB SERPL-MCNC: <0.1 NG/ML (ref 1.5–38.5)

## 2023-10-31 RX ORDER — LEVOTHYROXINE SODIUM 88 UG/1
88 TABLET ORAL DAILY
Qty: 90 TABLET | Refills: 3 | Status: SHIPPED | OUTPATIENT
Start: 2023-10-31

## 2023-12-18 DIAGNOSIS — I10 ESSENTIAL HYPERTENSION: ICD-10-CM

## 2023-12-18 DIAGNOSIS — E78.5 HYPERLIPIDEMIA, UNSPECIFIED HYPERLIPIDEMIA TYPE: ICD-10-CM

## 2023-12-18 RX ORDER — LOSARTAN POTASSIUM 100 MG/1
TABLET ORAL
Qty: 90 TABLET | Refills: 0 | Status: SHIPPED | OUTPATIENT
Start: 2023-12-18

## 2023-12-18 RX ORDER — PRAVASTATIN SODIUM 40 MG
TABLET ORAL
Qty: 90 TABLET | Refills: 0 | Status: SHIPPED | OUTPATIENT
Start: 2023-12-18

## 2023-12-27 ENCOUNTER — APPOINTMENT (OUTPATIENT)
Dept: LAB | Facility: CLINIC | Age: 73
End: 2023-12-27
Payer: MEDICARE

## 2023-12-27 DIAGNOSIS — C73 CARCINOMA OF THYROID (HCC): Primary | ICD-10-CM

## 2023-12-27 DIAGNOSIS — C73 CARCINOMA OF THYROID (HCC): ICD-10-CM

## 2023-12-27 LAB
T4 FREE SERPL-MCNC: 1.35 NG/DL (ref 0.61–1.12)
TSH SERPL DL<=0.05 MIU/L-ACNC: 0.02 UIU/ML (ref 0.45–4.5)

## 2023-12-27 PROCEDURE — 36415 COLL VENOUS BLD VENIPUNCTURE: CPT

## 2023-12-27 PROCEDURE — 84439 ASSAY OF FREE THYROXINE: CPT

## 2023-12-27 RX ORDER — LEVOTHYROXINE SODIUM 0.07 MG/1
75 TABLET ORAL DAILY
Qty: 90 TABLET | Refills: 3 | Status: SHIPPED | OUTPATIENT
Start: 2023-12-27

## 2024-03-13 DIAGNOSIS — I10 ESSENTIAL HYPERTENSION: ICD-10-CM

## 2024-03-13 DIAGNOSIS — E78.5 HYPERLIPIDEMIA, UNSPECIFIED HYPERLIPIDEMIA TYPE: ICD-10-CM

## 2024-03-13 RX ORDER — PRAVASTATIN SODIUM 40 MG
TABLET ORAL
Qty: 90 TABLET | Refills: 0 | Status: SHIPPED | OUTPATIENT
Start: 2024-03-13

## 2024-03-13 RX ORDER — LOSARTAN POTASSIUM 100 MG/1
TABLET ORAL
Qty: 90 TABLET | Refills: 0 | Status: SHIPPED | OUTPATIENT
Start: 2024-03-13

## 2024-03-26 NOTE — PROGRESS NOTES
PT Evaluation     Today's date: 3/27/2024  Patient name: Melyssa Payton  : 1950  MRN: [id number]  Referring provider: Jose Cm MD  Dx:   Encounter Diagnosis     ICD-10-CM    1. Left hip pain  M25.552           Start Time: 0800  Stop Time: 0846  Total time in clinic (min): 46 minutes    Assessment  Assessment details: Problem List:  1) Left lower extremity strength  2) Hip mobility  3) Neural tension    Melyssa Payton is a pleasant 73 y.o. female who presents with left hip pain. She has reduced hip motion on her involved side, diminished hip strength, with most deficits with extension and abduction, and neural tension, with a positive slump test. Deficits result in pain with transfers, and gait deficits while walking including a compensated trendelenburg gait, and shortened stride length on her right. No further referral appears necessary at this time based upon examination results.  I expect she will have fair improvement with skilled physical therapy. .        Comparable signs:  1) Pain rising from a seated position  2) Pain rolling to her left at night  Impairments: abnormal gait, abnormal or restricted ROM, activity intolerance, impaired physical strength, lacks appropriate home exercise program, pain with function and poor body mechanics    Symptom irritability: moderateUnderstanding of Dx/Px/POC: good   Prognosis: fair    Goals  Short Term Goals:   1. Patient will demonstrate independence with HEP by providing return demonstration of exercises  2. Patient will report decreased symptom intensity during activity by 50%  3. Patient will perform 5 sit to stands without symptom increase  4. Patient will ascend half flight of steps without onset of symptoms  5. Patient will turn in bed with mild onset of symptoms    Long Term Goals:   1. Patient will improve FOTO to greater then goal  2. Patient will improve pain with activity to 2/10 or less  3. Patient will continue with HEP to allow  for continued progress and function  4. Patient will reduce 5x sit to stand time by 6 seconds to reflect improvement in strength and decrease falls risk  5. Patient will ascend multiple flights of steps with mild onset of symptoms.           Plan  Patient would benefit from: skilled physical therapy  Referral necessary: No  Planned modality interventions: biofeedback, cryotherapy and thermotherapy: hydrocollator packs  Planned therapy interventions: joint mobilization, manual therapy, muscle pump exercises, neuromuscular re-education, patient education, strengthening, stretching, therapeutic activities, therapeutic exercise, home exercise program, graded exercise, graded activity, gait training, functional ROM exercises, flexibility, body mechanics training, balance, abdominal trunk stabilization and motor coordination training  Frequency: 2x week  Duration in weeks: 8  Plan of Care beginning date: 3/27/2024  Plan of Care expiration date: 5/22/2024  Treatment plan discussed with: patient        Subjective Evaluation    History of Present Illness  Mechanism of injury:   Melyssa reports she developed left hip, thigh and knee pain around a month ago after a shift at the [employer]. She reports feeling fine during her shift, but symptom started after. She denies trauma or inciting event. Her pain had been worsening, and is worse with transition movements and left hip motions. She reports symptoms do not disturb sleep or wake her at night. She denies numbness or tingling, and symptoms distal to the knee. She endorses development of back pain afterwards, but notes symptoms are independent of each other.     Imaging 3/21  Impression: Three-view radiographs left hip show no significant bony   abnormalities.  There is mild joint space narrowing without osteophyte   formation or increased subchondral sclerosis.  No evidence for lucency   fracture.             Not a recurrent problem   Quality of life: good    Patient  Goals  Patient goals for therapy: decreased pain, increased motion, independence with ADLs/IADLs, return to sport/leisure activities, return to work and improved balance  Patient goal: walk without pain  Pain  Current pain ratin  At best pain ratin  At worst pain ratin  Location: Anterior thigh.  Quality: Pain.  Relieving factors: change in position and rest (sitting)  Aggravating factors: walking (Rolling from right to left in bed. standing from sitting. going up steps)          Objective    Palpation: non tender through quadriceps, glute med, glute max, piriformis, adductor group    Dermatome: (L/R):    L1: Intact to light touch bilaterally  L2: Intact to light touch bilaterally  L3: Intact to light touch bilaterally  L4: Intact to light touch bilaterally  L5: Intact to light touch bilaterally  S1: Intact to light touch bilaterally     Reflexes:  (L/R)   L3-4:  2+        S1: 2+               GAIT: Antalgic with limited hip extension on the left. Compensated trendelenburg gait on the left      Lumbar  % of normal   Flex. 100   Extn. 50   SG Left 50   SG Right 50   ROT Left 100   ROT Right 100   Repeated Movements  Standing:  extension=no effect     Flexion= no effect        MMT         AROM          PROM    Hip       L       R        L           R      L     R   Flex. (L1,L2,L3) 3+ 4- 95 (p) 110 110 (ERP) 110   Extn. 2+ (p)        Abd. 2+ (p)    20 (p) 30   IR.     35 (p) 45   ER.                  Knee         Ext (L3) 3+ 4-       Flex (S2) 4 4                Ankle/foot         DF (L4) 4 4       PF (S1) 4 4         Neuro Dynamic Testing:  Slump test: L= positive    R=  negative       Straight leg raise:   L=  negative    R= negative             Hip:   scour= positive        travis = positive  faddir=   positive      TU seconds    5xSTS: 20 seconds       Precautions: Hx L. TKA 2017. Hx thyroid cancer.       Manuals 3/27            Caudo lateral distraction                                                     Neuro Re-Ed             Linda denis march             Balance board                                                                              Ther Ex             Bridge HEP            clamshell GTB HEP  S/l poss nv            Lateral walks             VG             Slump tensioner                                                    Ther Activity                                       Gait Training                                       Modalities

## 2024-03-27 ENCOUNTER — EVALUATION (OUTPATIENT)
Dept: PHYSICAL THERAPY | Facility: REHABILITATION | Age: 74
End: 2024-03-27
Payer: MEDICARE

## 2024-03-27 DIAGNOSIS — M25.552 LEFT HIP PAIN: Primary | ICD-10-CM

## 2024-03-27 PROCEDURE — 97110 THERAPEUTIC EXERCISES: CPT

## 2024-03-27 PROCEDURE — 97162 PT EVAL MOD COMPLEX 30 MIN: CPT

## 2024-03-27 NOTE — LETTER
2024    Jose Cm MD  [address] [place] [address]    Patient: Melyssa Payton   YOB: 1950   Date of Visit: 3/27/2024     Encounter Diagnosis     ICD-10-CM    1. Left hip pain  M25.552           Dear Dr. Cm:    Thank you for your recent referral of Melyssa Payton. Please review the attached evaluation summary from Melyssa's recent visit.     Please verify that you agree with the plan of care by signing the attached order.     If you have any questions or concerns, please do not hesitate to call.     I sincerely appreciate the opportunity to share in the care of one of your patients and hope to have another opportunity to work with you in the near future.       Sincerely,    Wil Parr, PT      Referring Provider:      I certify that I have read the below Plan of Care and certify the need for these services furnished under this plan of treatment while under my care.                    Jose Cm MD  [address] [place] [address]  Via Fax: [phone]          PT Evaluation     Today's date: 3/27/2024  Patient name: Melyssa Payton  : 1950  MRN: [id number]  Referring provider: Jose Cm MD  Dx:   Encounter Diagnosis     ICD-10-CM    1. Left hip pain  M25.552           Start Time: 0800  Stop Time: 0846  Total time in clinic (min): 46 minutes    Assessment  Assessment details: Problem List:  1) Left lower extremity strength  2) Hip mobility  3) Neural tension    Melyssa Payton is a pleasant 73 y.o. female who presents with left hip pain. She has reduced hip motion on her involved side, diminished hip strength, with most deficits with extension and abduction, and neural tension, with a positive slump test. Deficits result in pain with transfers, and gait deficits while walking including a compensated trendelenburg gait, and shortened stride length on her right. No further referral appears necessary at this time based  upon examination results.  I expect she will have fair improvement with skilled physical therapy. .        Comparable signs:  1) Pain rising from a seated position  2) Pain rolling to her left at night  Impairments: abnormal gait, abnormal or restricted ROM, activity intolerance, impaired physical strength, lacks appropriate home exercise program, pain with function and poor body mechanics    Symptom irritability: moderateUnderstanding of Dx/Px/POC: good   Prognosis: fair    Goals  Short Term Goals:   1. Patient will demonstrate independence with HEP by providing return demonstration of exercises  2. Patient will report decreased symptom intensity during activity by 50%  3. Patient will perform 5 sit to stands without symptom increase  4. Patient will ascend half flight of steps without onset of symptoms  5. Patient will turn in bed with mild onset of symptoms    Long Term Goals:   1. Patient will improve FOTO to greater then goal  2. Patient will improve pain with activity to 2/10 or less  3. Patient will continue with HEP to allow for continued progress and function  4. Patient will reduce 5x sit to stand time by 6 seconds to reflect improvement in strength and decrease falls risk  5. Patient will ascend multiple flights of steps with mild onset of symptoms.           Plan  Patient would benefit from: skilled physical therapy  Referral necessary: No  Planned modality interventions: biofeedback, cryotherapy and thermotherapy: hydrocollator packs  Planned therapy interventions: joint mobilization, manual therapy, muscle pump exercises, neuromuscular re-education, patient education, strengthening, stretching, therapeutic activities, therapeutic exercise, home exercise program, graded exercise, graded activity, gait training, functional ROM exercises, flexibility, body mechanics training, balance, abdominal trunk stabilization and motor coordination training  Frequency: 2x week  Duration in weeks: 8  Plan of Care  beginning date: 3/27/2024  Plan of Care expiration date: 2024  Treatment plan discussed with: patient        Subjective Evaluation    History of Present Illness  Mechanism of injury:   Melyssa reports she developed left hip, thigh and knee pain around a month ago after a shift at the [employer]. She reports feeling fine during her shift, but symptom started after. She denies trauma or inciting event. Her pain had been worsening, and is worse with transition movements and left hip motions. She reports symptoms do not disturb sleep or wake her at night. She denies numbness or tingling, and symptoms distal to the knee. She endorses development of back pain afterwards, but notes symptoms are independent of each other.     Imaging 3/21  Impression: Three-view radiographs left hip show no significant bony   abnormalities.  There is mild joint space narrowing without osteophyte   formation or increased subchondral sclerosis.  No evidence for lucency   fracture.             Not a recurrent problem   Quality of life: good    Patient Goals  Patient goals for therapy: decreased pain, increased motion, independence with ADLs/IADLs, return to sport/leisure activities, return to work and improved balance  Patient goal: walk without pain  Pain  Current pain ratin  At best pain ratin  At worst pain ratin  Location: Anterior thigh.  Quality: Pain.  Relieving factors: change in position and rest (sitting)  Aggravating factors: walking (Rolling from right to left in bed. standing from sitting. going up steps)          Objective    Palpation: non tender through quadriceps, glute med, glute max, piriformis, adductor group    Dermatome: (L/R):    L1: Intact to light touch bilaterally  L2: Intact to light touch bilaterally  L3: Intact to light touch bilaterally  L4: Intact to light touch bilaterally  L5: Intact to light touch bilaterally  S1: Intact to light touch bilaterally     Reflexes:  (L/R)   L3-4:  2+        S1: 2+                GAIT: Antalgic with limited hip extension on the left. Compensated trendelenburg gait on the left      Lumbar  % of normal   Flex. 100   Extn. 50   SG Left 50   SG Right 50   ROT Left 100   ROT Right 100   Repeated Movements  Standing:  extension=no effect     Flexion= no effect        MMT         AROM          PROM    Hip       L       R        L           R      L     R   Flex. (L1,L2,L3) 3+ 4- 95 (p) 110 110 (ERP) 110   Extn. 2+ (p)        Abd. 2+ (p)    20 (p) 30   IR.     35 (p) 45   ER.                  Knee         Ext (L3) 3+ 4-       Flex (S2) 4 4                Ankle/foot         DF (L4) 4 4       PF (S1) 4 4         Neuro Dynamic Testing:  Slump test: L= positive    R=  negative       Straight leg raise:   L=  negative    R= negative             Hip:   scour= positive        travis = positive  faddir=   positive      TU seconds    5xSTS: 20 seconds       Precautions: Hx L. TKA 2017. Hx thyroid cancer.       Manuals 3/27            Caudo lateral distraction                                                    Neuro Re-Ed             Sand dune march             Balance board                                                                              Ther Ex             Bridge HEP            clamshell GTB HEP  S/l poss nv            Lateral walks             VG             Slump tensioner                                                    Ther Activity                                       Gait Training                                       Modalities

## 2024-03-28 ENCOUNTER — APPOINTMENT (OUTPATIENT)
Dept: LAB | Facility: CLINIC | Age: 74
End: 2024-03-28
Payer: MEDICARE

## 2024-03-28 DIAGNOSIS — C73 CARCINOMA OF THYROID (HCC): ICD-10-CM

## 2024-03-28 LAB
T4 FREE SERPL-MCNC: 1.09 NG/DL (ref 0.61–1.12)
TSH SERPL DL<=0.05 MIU/L-ACNC: 0.43 UIU/ML (ref 0.45–4.5)

## 2024-03-28 PROCEDURE — 36415 COLL VENOUS BLD VENIPUNCTURE: CPT

## 2024-03-28 PROCEDURE — 84439 ASSAY OF FREE THYROXINE: CPT

## 2024-03-29 ENCOUNTER — TELEPHONE (OUTPATIENT)
Dept: SURGERY | Facility: CLINIC | Age: 74
End: 2024-03-29

## 2024-03-29 NOTE — TELEPHONE ENCOUNTER
Telephone call from patient that she just recently got her thyroid labs done and would like to know how she should adjust her medicine or if she should be keeping the dosing of her Levothyroxine the same. I told her I would have to send a message over to the office to have someone from there reach out to her regarding this.     The best number to reach her at is [phone]

## 2024-04-01 ENCOUNTER — OFFICE VISIT (OUTPATIENT)
Dept: PHYSICAL THERAPY | Facility: REHABILITATION | Age: 74
End: 2024-04-01
Payer: MEDICARE

## 2024-04-01 DIAGNOSIS — M25.552 LEFT HIP PAIN: Primary | ICD-10-CM

## 2024-04-01 DIAGNOSIS — C73 CARCINOMA OF THYROID (HCC): ICD-10-CM

## 2024-04-01 PROCEDURE — 97110 THERAPEUTIC EXERCISES: CPT

## 2024-04-01 PROCEDURE — 97112 NEUROMUSCULAR REEDUCATION: CPT

## 2024-04-01 NOTE — TELEPHONE ENCOUNTER
NOT A DUP!!! Patient contacted the pharmacy and they stated they do not have the refills    Reason for call:   [x] Refill   [] Prior Auth  [] Other:     Office:   [] PCP/Provider -   [x] Specialty/Provider - GEN SURG    Medication:     Pharmacy:   [facility] - [facility] - [address] [facility]  P: [phone]  F: [phone]  Does the patient have enough for 3 days?   [] Yes   [x] No - Send as HP to POD

## 2024-04-01 NOTE — PROGRESS NOTES
"Daily Note     Today's date: 2024  Patient name: Melyssa Payton  : 1950  MRN: [id number]  Referring provider: Jose Cm MD  Dx:   Encounter Diagnosis     ICD-10-CM    1. Left hip pain  M25.552                      Subjective: Pt reports continued pain at L hip, especially after sitting for extended periods of time or changing positions.  Numbness at L knee always present/lingering.        Objective: See treatment diary below      Assessment: Tolerated treatment well. Initiated POC as outlined below.  Most discomfort reported during attempt at sidelying clamshell.  Pain present in groin and L ant knee.  No pain with slump tensioner but not change in numbness. Patient would benefit from continued PT to further improve strength, decrease pain, and maximize overall function.        Plan: Continue per plan of care.      Precautions: Hx L. TKA 2017. Hx thyroid cancer.       Manuals 3/27 4/1           Caudo lateral distraction  Prom glide  AFB                                                  Neuro Re-Ed             [facility]?           Balance board  Taps  2x10 ea                                                                            Ther Ex             Bridge HEP 3\"  2x10           clamshell GTB HEP  S/l poss nv S/L  3\"x10  Pain!,  supine  YHB  3\"x10           Lateral walks  At bar  3 laps           VG  L5 -7'           Slump tensioner  2x10                                                  Ther Activity                                       Gait Training                                       Modalities                                            "

## 2024-04-04 ENCOUNTER — OFFICE VISIT (OUTPATIENT)
Dept: PHYSICAL THERAPY | Facility: REHABILITATION | Age: 74
End: 2024-04-04
Payer: MEDICARE

## 2024-04-04 DIAGNOSIS — M25.552 LEFT HIP PAIN: Primary | ICD-10-CM

## 2024-04-04 PROCEDURE — 97140 MANUAL THERAPY 1/> REGIONS: CPT | Performed by: PHYSICAL THERAPIST

## 2024-04-04 PROCEDURE — 97110 THERAPEUTIC EXERCISES: CPT | Performed by: PHYSICAL THERAPIST

## 2024-04-04 NOTE — PROGRESS NOTES
"Daily Note     Today's date: 2024  Patient name: Melyssa Payton  : 1950  MRN: [id number]  Referring provider: Jose Cm MD  Dx:   Encounter Diagnosis     ICD-10-CM    1. Left hip pain  M25.552           Start Time: 1530  Stop Time: 1600  Total time in clinic (min): 30 minutes    Subjective: Pt reports her hip still hurts. Still has the numbness in her knee at times.       Objective: See treatment diary below      Assessment: Tolerated treatment well. Tightness noted into ER of L hip. Improved with mobilizations. Patient would benefit from continued PT to further improve strength, decrease pain, and maximize overall function.        Plan: Continue per plan of care.      Precautions: Hx L. TKA 2017. Hx thyroid cancer.       Manuals 3/27 4/1 4/4          Caudo lateral distraction  Prom glide  AFB 2x10 with ER          ER contract/relax   2 bouts 5x5\"                       Assessment   QD          Neuro Re-Ed   4/4          Sand dune march  Nv?           Balance board  Taps  2x10 ea                                                                            Ther Ex             Bridge HEP 3\"  2x10 2x10x3\" YHB          clamshell GTB HEP  S/l poss nv S/L  3\"x10  Pain!,  supine  YHB  3\"x10 15x no band pillow between knees          Lateral walks  At bar  3 laps           VG  L5 -7'           Slump tensioner  2x10           Hip abd iso   10x5\" L side                                    Ther Activity                                       Gait Training                                       Modalities                                            "

## 2024-04-09 ENCOUNTER — OFFICE VISIT (OUTPATIENT)
Dept: PHYSICAL THERAPY | Facility: REHABILITATION | Age: 74
End: 2024-04-09
Payer: MEDICARE

## 2024-04-09 DIAGNOSIS — M25.552 LEFT HIP PAIN: Primary | ICD-10-CM

## 2024-04-09 PROCEDURE — 97140 MANUAL THERAPY 1/> REGIONS: CPT

## 2024-04-09 PROCEDURE — 97110 THERAPEUTIC EXERCISES: CPT

## 2024-04-09 NOTE — PROGRESS NOTES
"Daily Note     Today's date: 2024  Patient name: Melyssa Payton  : 1950  MRN: [id number]  Referring provider: Jose Cm MD  Dx:   Encounter Diagnosis     ICD-10-CM    1. Left hip pain  M25.552           Start Time: 0758  Stop Time: 0844  Total time in clinic (min): 46 minutes    Subjective: Melyssa says her hip was really bothering her yesterday into her groin. The numbness she has been feeling is around her knee replacement, mostly around her incision.       Objective: See treatment diary below      Assessment: Tolerated treatment well, with positive response to manual interventions performed today. Incorporated stretching interventions for anterior chain with similar response. Patient continues to demonstrate trendelenburg gait. Increased volume of hip control interventions with good response. Provided verbal cueing to reduce valgus compensation during VG interventions with symptom increase. Patient demonstrated fatigue post treatment and would benefit from continued PT.       Plan: Continue per plan of care.      Precautions: Hx L. TKA 2017. Hx thyroid cancer.       Manuals 3/27 4/1 4/4 4/9         Caudo lateral distraction  Prom glide  AFB 2x10 with ER Supine gr 3-4 LM         ER contract/relax   2 bouts 5x5\"          PA mobs    Gr 3-4 LM prone with ER         Assessment   QD          Neuro Re-Ed   4/4 4/9         Sand [facility]  Nv?           Balance board  Taps  2x10 ea  Taps x2' ea                                                                          Ther Ex            Bridge HEP 3\"  2x10 2x10x3\" YHB 3x10 3\" hold YHB         clamshell GTB HEP  S/l poss nv S/L  3\"x10  Pain!,  supine  YHB  3\"x10 15x no band pillow between knees 2x10 R s/l pillow between knees         Lateral walks  At bar  3 laps  YHB 3 laps at counter         VG  L5 -7'  L6 6'          Slump tensioner  2x10  Np         Hip abd iso   10x5\" L side          KENNETH    Left leg back, counter support 1x10          "             Ther Activity                                       Gait Training                                       Modalities

## 2024-04-11 ENCOUNTER — OFFICE VISIT (OUTPATIENT)
Dept: PHYSICAL THERAPY | Facility: REHABILITATION | Age: 74
End: 2024-04-11
Payer: MEDICARE

## 2024-04-11 DIAGNOSIS — C73 CARCINOMA OF THYROID (HCC): Primary | ICD-10-CM

## 2024-04-11 DIAGNOSIS — M25.552 LEFT HIP PAIN: Primary | ICD-10-CM

## 2024-04-11 PROCEDURE — 97140 MANUAL THERAPY 1/> REGIONS: CPT

## 2024-04-11 PROCEDURE — 97110 THERAPEUTIC EXERCISES: CPT

## 2024-04-11 RX ORDER — LEVOTHYROXINE SODIUM 0.07 MG/1
75 TABLET ORAL DAILY
Qty: 90 TABLET | Refills: 3 | Status: SHIPPED | OUTPATIENT
Start: 2024-04-11

## 2024-04-11 NOTE — PROGRESS NOTES
"Daily Note     Today's date: 2024  Patient name: Melyssa Payton  : 1950  MRN: [id number]  Referring provider: Jose Cm MD  Dx:   Encounter Diagnosis     ICD-10-CM    1. Left hip pain  M25.552           Start Time: 0800  Stop Time: 0846  Total time in clinic (min): 46 minutes    Subjective: Melyssa says she had more pain after treatment last time. She still has a hard time sitting for a long time, especially on the couch.       Objective: See treatment diary below    Patient is unable to perform SLR in supine      Assessment: Tolerated treatment fair. Incorporated strengthening interventions directed at hip flexor and knee extensor musculature to target impairments. Patient experienced initial symptom onset with interventions, which reduced with repetition. Patient demonstrated fatigue post treatment and would benefit from continued PT      Plan: Continue per plan of care.      Precautions: Hx L. TKA 2017. Hx thyroid cancer.       Manuals 3/27 4/1 4/4 4/9 4/11        Caudo lateral distraction  Prom glide  AFB 2x10 with ER Supine gr 3-4 LM Supine gr 3-4 LM        ER contract/relax   2 bouts 5x5\"          PA mobs    Gr 3-4 LM prone with ER Gr 3-4 lm prone with ER.         Assessment   QD  LM        STM     L. Hip flex LM        Neuro Re-Ed   4/4 4/9 4/11        [facility]  Nv?           Balance board  Taps  2x10 ea  Taps x2' ea                                                                          Ther Ex            Bridge HEP 3\"  2x10 2x10x3\" YHB 3x10 3\" hold YHB 3x10 3\" hold YHB        clamshell GTB HEP  S/l poss nv S/L  3\"x10  Pain!,  supine  YHB  3\"x10 15x no band pillow between knees 2x10 R s/l pillow between knees Np- hold( p)        Lateral walks  At bar  3 laps  YHB 3 laps at counter Np        VG  L5 -7'  L6 6'          Slump tensioner  2x10  Np         Hip abd iso   10x5\" L side          KENNETH    Left leg back, counter support 1x10 HEP        Supine march     1x10 MRE, " Light orange 2x8 on thigh  HEP- no resistance        Leg extension     Gtb 1x10 increase nv        Ther Activity                                       Gait Training                                       Modalities

## 2024-04-11 NOTE — TELEPHONE ENCOUNTER
Refill must be reviewed and completed by the office or provider. The refill is unable to be approved or denied by the medication management team.

## 2024-04-15 ENCOUNTER — OFFICE VISIT (OUTPATIENT)
Dept: PHYSICAL THERAPY | Facility: REHABILITATION | Age: 74
End: 2024-04-15
Payer: MEDICARE

## 2024-04-15 DIAGNOSIS — M25.552 LEFT HIP PAIN: Primary | ICD-10-CM

## 2024-04-15 PROCEDURE — 97112 NEUROMUSCULAR REEDUCATION: CPT

## 2024-04-15 PROCEDURE — 97110 THERAPEUTIC EXERCISES: CPT

## 2024-04-15 PROCEDURE — 97140 MANUAL THERAPY 1/> REGIONS: CPT

## 2024-04-15 NOTE — PROGRESS NOTES
"Daily Note     Today's date: 4/15/2024  Patient name: Melyssa Payton  : 1950  MRN: [id number]  Referring provider: Jose Cm MD  Dx:   Encounter Diagnosis     ICD-10-CM    1. Left hip pain  M25.552           Start Time: 0800  Stop Time: 0846  Total time in clinic (min): 46 minutes    Subjective: Melyssa reports not feeling as bad after last session      Objective: See treatment diary below      Assessment: Tolerated treatment well. Patient continues to experience discomfort with external rotation, with end range improving with manual therapy directed at anterior capsule. Incorporated additional standing interventions to address hip extension restriction and improve gait quality. Due to discomfort with extension and external rotation, discussed positional modifications to improve comfort during transition motions. Patient demonstrated fatigue post treatment and would benefit from continued PT      Plan: Continue per plan of care.      Precautions: Hx L. TKA 2017. Hx thyroid cancer.       Manuals 3/27 4/1 4/4 4/9 4/11 4/15       Caudo lateral distraction  Prom glide  AFB 2x10 with ER Supine gr 3-4 LM Supine gr 3-4 LM        ER contract/relax   2 bouts 5x5\"          PA mobs    Gr 3-4 LM prone with ER Gr 3-4 lm prone with ER.  Gr 3-4 LM prone  with ER       Assessment   QD  LM        STM     L. Hip flex LM L. Hip flex LM       Neuro Re-Ed           [facility]  Nv?           Balance board  Taps  2x10 ea  Taps x2' ea  Taps x 2' ea       Hip extension      3x10 at wall with slider                                                            Ther Ex            Bridge HEP 3\"  2x10 2x10x3\" YHB 3x10 3\" hold YHB 3x10 3\" hold YHB 3x10 3\" hold YHB increase NV       clamshell GTB HEP  S/l poss nv S/L  3\"x10  Pain!,  supine  YHB  3\"x10 15x no band pillow between knees 2x10 R s/l pillow between knees Np- hold( p) Np       Lateral walks  At bar  3 laps  YHB 3 laps at counter Np YHB 3 laps " "at counter       VG  L5 -7'  L6 6'   L6 6'       Slump tensioner  2x10  Np         Hip abd iso   10x5\" L side          KENNETH    Left leg back, counter support 1x10 HEP        Supine march     1x10 MRE, Light orange 2x8 on thigh  HEP- no resistance 3x10 light orange on thigh HEP       Leg extension     Gtb 1x10 increase nv 10# 3x10       Ther Activity                                       Gait Training                                       Modalities                                                  "

## 2024-04-18 ENCOUNTER — OFFICE VISIT (OUTPATIENT)
Dept: PHYSICAL THERAPY | Facility: REHABILITATION | Age: 74
End: 2024-04-18
Payer: MEDICARE

## 2024-04-18 DIAGNOSIS — M25.552 LEFT HIP PAIN: Primary | ICD-10-CM

## 2024-04-18 PROCEDURE — 97140 MANUAL THERAPY 1/> REGIONS: CPT

## 2024-04-18 PROCEDURE — 97110 THERAPEUTIC EXERCISES: CPT

## 2024-04-18 RX ORDER — LEVOTHYROXINE SODIUM 0.07 MG/1
75 TABLET ORAL DAILY
Qty: 90 TABLET | Refills: 1 | Status: SHIPPED | OUTPATIENT
Start: 2024-04-18

## 2024-04-18 NOTE — PROGRESS NOTES
"Daily Note     Today's date: 2024  Patient name: Melyssa Payton  : 1950  MRN: [id number]  Referring provider: Jose Cm MD  Dx:   Encounter Diagnosis     ICD-10-CM    1. Left hip pain  M25.552           Start Time: 0801  Stop Time: 0850  Total time in clinic (min): 49 minutes    Subjective: Melyssa says her left knee is still numb, and has been getting pain just below her knee on her left side today.       Objective: See treatment diary below    Sustained extension: no effect, no effect  Sustained flexion: no effect, improve    Assessment: Hip symptoms and numbness unchanged with sustained positioning. Prescribed flexion based interventions due to response in clinic and resolution of left knee pain. Reassess next session. Tolerated treatment fair, with patient experiencing fatigue and soreness throughout session. Patient demonstrated fatigue post treatment and would benefit from continued PT      Plan: Continue per plan of care.      Precautions: Hx L. TKA 2017. Hx thyroid cancer.       Manuals 3/27 4/1 4/4 4/9 4/11 4/15 4/18      Caudo lateral distraction  Prom glide  AFB 2x10 with ER Supine gr 3-4 LM Supine gr 3-4 LM        ER contract/relax   2 bouts 5x5\"          PA mobs    Gr 3-4 LM prone with ER Gr 3-4 lm prone with ER.  Gr 3-4 LM prone  with ER Gr 3-4 prone with ER      Assessment   QD  LM  LM      STM     L. Hip flex LM L. Hip flex LM       Neuro Re-Ed   4/4 4/9 4/11  4/18      Sand [facility]  Nv?           Balance board  Taps  2x10 ea  Taps x2' ea  Taps x 2' ea       Hip extension      3x10 at wall with slider                                                            Ther Ex            Bridge HEP 3\"  2x10 2x10x3\" YHB 3x10 3\" hold YHB 3x10 3\" hold YHB 3x10 3\" hold YHB increase NV RHB 3x10 RHB      clamshell GTB HEP  S/l poss nv S/L  3\"x10  Pain!,  supine  YHB  3\"x10 15x no band pillow between knees 2x10 R s/l pillow between knees Np- hold( p) Np       Lateral walks  At " "bar  3 laps  YHB 3 laps at counter Np YHB 3 laps at counter YHB 3 laps at counter      VG  L5 -7'  L6 6'   L6 6' L7 5'      Slump tensioner  2x10  Np         Hip abd iso   10x5\" L side          KENNETH    Left leg back, counter support 1x10 HEP        Supine march     1x10 MRE, Light orange 2x8 on thigh  HEP- no resistance 3x10 light orange on thigh HEP 2x10 light orange on thigh-  Knee in ~20 deg ext      Leg extension     Gtb 1x10 increase nv 10# 3x10 10# 3x10      Flexion in supine       2x2'      Ther Activity                                       Gait Training                                       Modalities                                                    "

## 2024-04-22 ENCOUNTER — OFFICE VISIT (OUTPATIENT)
Dept: PHYSICAL THERAPY | Facility: REHABILITATION | Age: 74
End: 2024-04-22
Payer: MEDICARE

## 2024-04-22 DIAGNOSIS — M25.552 LEFT HIP PAIN: Primary | ICD-10-CM

## 2024-04-22 PROCEDURE — 97110 THERAPEUTIC EXERCISES: CPT

## 2024-04-22 PROCEDURE — 97140 MANUAL THERAPY 1/> REGIONS: CPT

## 2024-04-22 NOTE — PROGRESS NOTES
"Daily Note     Today's date: 2024  Patient name: Melyssa Payton  : 1950  MRN: [id number]  Referring provider: Jose Cm MD  Dx:   Encounter Diagnosis     ICD-10-CM    1. Left hip pain  M25.552           Start Time: 0800  Stop Time: 08  Total time in clinic (min): 46 minutes    Subjective: Melyssa says she had a really bad day over the weekend and couldn't even sit because of her pain. She says the flexion exercises didn't change her pain or really help.       Objective: See treatment diary below    Palpation- tender throughout glute medius/minimus, and greater trochanter. Palpation of glute med/minimus reproduces groin symptoms    Comparable sign- putting socks and shoes on.     Assessment: Symptoms do not appear to be lumbar referral, with lack of change with flexion based interventions. Distal symptoms reproduced with palpation performed today. Incorporated STM to address soft tissue dysfunction. Continued with interventions to improve hip strength and motion. Tolerated treatment fair with soreness and pain experienced with addition of lateral stepping interventions. Patient demonstrated fatigue post treatment and would benefit from continued PT      Plan: Continue per plan of care.      Precautions: Hx L. TKA 2017. Hx thyroid cancer.       Manuals 3/27 4/1 4/4 4/9 4/11 4/15 4/18 4/22     Caudo lateral distraction  Prom glide  AFB 2x10 with ER Supine gr 3-4 LM Supine gr 3-4 LM   Supine gr III-IV into ER     ER contract/relax   2 bouts 5x5\"          PA mobs    Gr 3-4 LM prone with ER Gr 3-4 lm prone with ER.  Gr 3-4 LM prone  with ER Gr 3-4 prone with ER      Assessment   QD  LM  LM LM     STM     L. Hip flex LM L. Hip flex LM  L. Glute med LM     Neuro Re-Ed   4/4 4/9 4/11  4/18      [facility]  Nv?           Balance board  Taps  2x10 ea  Taps x2' ea  Taps x 2' ea  Taps x 2' lat     Hip extension      3x10 at wall with slider                                                        " "    Ther Ex   4/4 4/9         Bridge HEP 3\"  2x10 2x10x3\" YHB 3x10 3\" hold YHB 3x10 3\" hold YHB 3x10 3\" hold YHB increase NV RHB 3x10 RHB RHB 3x10      clamshell GTB HEP  S/l poss nv S/L  3\"x10  Pain!,  supine  YHB  3\"x10 15x no band pillow between knees 2x10 R s/l pillow between knees Np- hold( p) Np       Lateral walks  At bar  3 laps  YHB 3 laps at counter Np YHB 3 laps at counter YHB 3 laps at counter YHB 3 laps at counter     VG  L5 -7'  L6 6'   L6 6' L7 5' L7 5'     Slump tensioner  2x10  Np         Hip abd iso   10x5\" L side          KENNETH    Left leg back, counter support 1x10 HEP        Supine march     1x10 MRE, Light orange 2x8 on thigh  HEP- no resistance 3x10 light orange on thigh HEP 2x10 light orange on thigh-  Knee in ~20 deg ext SLR 2x8     Leg extension     Gtb 1x10 increase nv 10# 3x10 10# 3x10 10# 3x10     Flexion in supine       2x2' D/c     Step ups        Lateral 2x8 4\"     Ther Activity                                       Gait Training                                       Modalities                                                      "

## 2024-04-23 ENCOUNTER — RA CDI HCC (OUTPATIENT)
Dept: OTHER | Facility: HOSPITAL | Age: 74
End: 2024-04-23

## 2024-04-25 ENCOUNTER — OFFICE VISIT (OUTPATIENT)
Dept: PHYSICAL THERAPY | Facility: REHABILITATION | Age: 74
End: 2024-04-25
Payer: MEDICARE

## 2024-04-25 DIAGNOSIS — M25.552 LEFT HIP PAIN: Primary | ICD-10-CM

## 2024-04-25 PROCEDURE — 97110 THERAPEUTIC EXERCISES: CPT

## 2024-04-25 PROCEDURE — 97112 NEUROMUSCULAR REEDUCATION: CPT

## 2024-04-25 PROCEDURE — 97140 MANUAL THERAPY 1/> REGIONS: CPT

## 2024-04-25 NOTE — PROGRESS NOTES
"Daily Note     Today's date: 2024  Patient name: Melyssa Payton  : 1950  MRN: [id number]  Referring provider: Jose Cm MD  Dx:   Encounter Diagnosis     ICD-10-CM    1. Left hip pain  M25.552           Start Time: 0800  Stop Time: 0845  Total time in clinic (min): 45 minutes    Subjective: Melyssa says she has been trying the stretch at home, and it is still painful.       Objective: See treatment diary below      Assessment: Tolerated treatment fair. Symptoms reproduced with use of hip external rotators and abductors with palpation and muscle activation.Symptoms increase with passive hip adduction. Symptoms increase with intensity of contraction. Incorporated prolonged isometric holds to reduce symptom intensity with fair response. Discussed appropriate levels of symptom increase and potential for symptom increase after treatment. Patient demonstrated fatigue post treatment and would benefit from continued PT      Plan: Continue per plan of care.      Precautions: Hx L. TKA 2017. Hx thyroid cancer.       Manuals 3/27 4/1 4/4 4/9 4/11 4/15 4/18 4/22 4/25    Caudo lateral distraction  Prom glide  AFB 2x10 with ER Supine gr 3-4 LM Supine gr 3-4 LM   Supine gr III-IV into ER Supine gr III-IV into ER    ER contract/relax   2 bouts 5x5\"          PA mobs    Gr 3-4 LM prone with ER Gr 3-4 lm prone with ER.  Gr 3-4 LM prone  with ER Gr 3-4 prone with ER      Assessment   QD  LM  LM LM     STM     L. Hip flex LM L. Hip flex LM  L. Glute med LM L glute med LM    Neuro Re-Ed         [facility]  Nv?           Balance board  Taps  2x10 ea  Taps x2' ea  Taps x 2' ea  Taps x 2' lat     Hip extension      3x10 at wall with slider        Captain cody         No ball 2x45\" ea  With ball 2x45\" ea                                           Ther Ex            Bridge HEP 3\"  2x10 2x10x3\" YHB 3x10 3\" hold YHB 3x10 3\" hold YHB 3x10 3\" hold YHB increase NV RHB 3x10 RHB RHB 3x10  " "RHB 3x10    clamshell GTB HEP  S/l poss nv S/L  3\"x10  Pain!,  supine  YHB  3\"x10 15x no band pillow between knees 2x10 R s/l pillow between knees Np- hold( p) Np       Lateral walks  At bar  3 laps  YHB 3 laps at counter Np YHB 3 laps at counter YHB 3 laps at counter YHB 3 laps at counter     VG  L5 -7'  L6 6'   L6 6' L7 5' L7 5'     Slump tensioner  2x10  Np         Hip abd iso   10x5\" L side          KENNETH    Left leg back, counter support 1x10 HEP        Supine march     1x10 MRE, Light orange 2x8 on thigh  HEP- no resistance 3x10 light orange on thigh HEP 2x10 light orange on thigh-  Knee in ~20 deg ext SLR 2x8 SLR 3x8    Leg extension     Gtb 1x10 increase nv 10# 3x10 10# 3x10 10# 3x10     Flexion in supine       2x2' D/c     Step ups        Lateral 2x8 4\" Lateral 2x8 4\" ea    Ther Activity                                       Gait Training                                       Modalities                                                        "

## 2024-04-29 ENCOUNTER — OFFICE VISIT (OUTPATIENT)
Dept: PHYSICAL THERAPY | Facility: REHABILITATION | Age: 74
End: 2024-04-29
Payer: MEDICARE

## 2024-04-29 DIAGNOSIS — M25.552 LEFT HIP PAIN: Primary | ICD-10-CM

## 2024-04-29 PROCEDURE — 97112 NEUROMUSCULAR REEDUCATION: CPT

## 2024-04-29 PROCEDURE — 97140 MANUAL THERAPY 1/> REGIONS: CPT

## 2024-04-29 PROCEDURE — 97110 THERAPEUTIC EXERCISES: CPT

## 2024-04-29 NOTE — PROGRESS NOTES
Daily Note/ Re-evaluation     Today's date: 2024  Patient name: Melyssa Payton  : 1950  MRN: [id number]  Referring provider: Jose Cm MD  Dx:   Encounter Diagnosis     ICD-10-CM    1. Left hip pain  M25.552           Start Time: 0800  Stop Time: 0845  Total time in clinic (min): 45 minutes    Subjective: Melyssa says she was sore after last visit, but a little less than usual. She still has to wait a little to move after standing because of her pain, and occasionally has a hard time picking up her leg when going up stairs or getting into and out of the bath.       Objective: See treatment diary below    Pain  Current pain ratin  At best pain ratin  At worst pain ratin    Goals  Short Term Goals:   1. Patient will demonstrate independence with HEP by providing return demonstration of exercises  2. Patient will report decreased symptom intensity during activity by 50%  3. Patient will perform 5 sit to stands without symptom increase  4. Patient will ascend half flight of steps without onset of symptoms  5. Patient will turn in bed with mild onset of symptoms    Long Term Goals:   1. Patient will improve FOTO to greater then goal  2. Patient will improve pain with activity to 2/10 or less  3. Patient will continue with HEP to allow for continued progress and function  4. Patient will reduce 5x sit to stand time by 6 seconds to reflect improvement in strength and decrease falls risk  5. Patient will ascend multiple flights of steps with mild onset of symptoms.               MMT         AROM          PROM    Hip       L       R        L           R      L     R   Flex. (L1,L2,L3) 3+ 4- 95 (p) 110 110 (ERP) 110   Extn. 3+        Abd. 2+ (p)    40    Add     20    IR.     30 30   ER.     40 45     5xSTS: 16 seconds    Assessment: Patient reports mild-moderate improvement at this time, with most improvement in strength. However, patient continues to express difficulties with functional  "mobility and pain.  Patient has made progress towards their goals and has shown improvement throughout PT by demonstrating increased range of motion, increased strength, and improved tolerance to activity.  Patient continues to present with pain, decreased ROM, decreased strength, and decreased tolerance to activity. Melyssa would benefit from continued physical therapy to further improve their strength, range of motion and activity tolerance to maximize function. Thank you.   Tolerated treatment well. Increased volume of isometric hip abduction holds with patient exhibiting side bending compensation due to hip abductor weakness. Patient demonstrated fatigue post treatment and would benefit from continued PT      Plan: Continue per plan of care.      Precautions: Hx L. TKA 1/17/2017. Hx thyroid cancer.       Manuals 3/27 4/1 4/4 4/9 4/11 4/15 4/18 4/22 4/25 4/29   Caudo lateral distraction  Prom glide  AFB 2x10 with ER Supine gr 3-4 LM Supine gr 3-4 LM   Supine gr III-IV into ER Supine gr III-IV into ER Supine gr III-IV into ER/ Flex   ER contract/relax   2 bouts 5x5\"          PA mobs    Gr 3-4 LM prone with ER Gr 3-4 lm prone with ER.  Gr 3-4 LM prone  with ER Gr 3-4 prone with ER      Assessment   QD  LM  LM LM     STM     L. Hip flex LM L. Hip flex LM  L. Glute med LM L glute med LM NP   Neuro Re-Ed   4/4 4/9 4/11 4/18 4/29   [facility]  Nv?           Balance board  Taps  2x10 ea  Taps x2' ea  Taps x 2' ea  Taps x 2' lat     Hip extension      3x10 at wall with slider        Captain ross         No ball 2x45\" ea  With ball 2x45\" ea Ball 4x30\" ea VC                                          Ther Ex   4/4 4/9         Bridge HEP 3\"  2x10 2x10x3\" YHB 3x10 3\" hold YHB 3x10 3\" hold YHB 3x10 3\" hold YHB increase NV RHB 3x10 RHB RHB 3x10  RHB 3x10 3x10 RHB   clamshell GTB HEP  S/l poss nv S/L  3\"x10  Pain!,  supine  YHB  3\"x10 15x no band pillow between knees 2x10 R s/l pillow between knees Np- hold( p) Np     " "  Lateral walks  At bar  3 laps  YHB 3 laps at counter Np YHB 3 laps at counter YHB 3 laps at counter YHB 3 laps at counter     VG  L5 -7'  L6 6'   L6 6' L7 5' L7 5'  L7 4' YHB   Slump tensioner  2x10  Np         Hip abd iso   10x5\" L side          KENNETH    Left leg back, counter support 1x10 HEP        Supine march     1x10 MRE, Light orange 2x8 on thigh  HEP- no resistance 3x10 light orange on thigh HEP 2x10 light orange on thigh-  Knee in ~20 deg ext SLR 2x8 SLR 3x8 SLR 3x10   Leg extension     Gtb 1x10 increase nv 10# 3x10 10# 3x10 10# 3x10     Flexion in supine       2x2' D/c     Step ups        Lateral 2x8 4\" Lateral 2x8 4\" ea Lateral 3x8 4\" ea.    Ther Activity                                       Gait Training                                       Modalities                                                          "

## 2024-04-30 ENCOUNTER — OFFICE VISIT (OUTPATIENT)
Dept: FAMILY MEDICINE CLINIC | Facility: CLINIC | Age: 74
End: 2024-04-30
Payer: MEDICARE

## 2024-04-30 VITALS
HEART RATE: 100 BPM | WEIGHT: 167.25 LBS | RESPIRATION RATE: 18 BRPM | HEIGHT: 62 IN | BODY MASS INDEX: 30.78 KG/M2 | OXYGEN SATURATION: 96 % | DIASTOLIC BLOOD PRESSURE: 70 MMHG | TEMPERATURE: 98 F | SYSTOLIC BLOOD PRESSURE: 150 MMHG

## 2024-04-30 DIAGNOSIS — M25.552 PAIN OF LEFT HIP: Primary | ICD-10-CM

## 2024-04-30 DIAGNOSIS — I10 BENIGN ESSENTIAL HYPERTENSION: ICD-10-CM

## 2024-04-30 PROCEDURE — 99214 OFFICE O/P EST MOD 30 MIN: CPT | Performed by: FAMILY MEDICINE

## 2024-04-30 PROCEDURE — G2211 COMPLEX E/M VISIT ADD ON: HCPCS | Performed by: FAMILY MEDICINE

## 2024-04-30 RX ORDER — PREDNISONE 20 MG/1
TABLET ORAL
Qty: 15 TABLET | Refills: 0 | Status: SHIPPED | OUTPATIENT
Start: 2024-04-30

## 2024-04-30 NOTE — ASSESSMENT & PLAN NOTE
Hip pain.  Patient appears to have musculoskeletal cause of her symptoms however x-ray did not show significant arthritis.  She is scheduled to follow-up with her orthopedist this week.  She was given prescription for trial of prednisone tapering dose to see if this would help with any inflammation of her hip.  Patient will continue with physical therapy.  She may continue with Tylenol over-the-counter as needed.  If symptoms persist we may consider trial of gabapentin or Lyrica

## 2024-04-30 NOTE — ASSESSMENT & PLAN NOTE
Hypertension.  Blood pressure appears stable in the office.  Patient may continue trying to use arm on home blood pressure device however she will call if blood pressures continue to show above 140/90

## 2024-04-30 NOTE — PROGRESS NOTES
FAMILY PRACTICE OFFICE VISIT       NAME: Melyssa Payton  AGE: 73 y.o. SEX: female       : 1950        MRN: [id number]    DATE: 2024  TIME: 12:38 PM    Assessment and Plan     Problem List Items Addressed This Visit       Benign essential hypertension     Hypertension.  Blood pressure appears stable in the office.  Patient may continue trying to use arm on home blood pressure device however she will call if blood pressures continue to show above 140/90         Pain of left hip - Primary     Hip pain.  Patient appears to have musculoskeletal cause of her symptoms however x-ray did not show significant arthritis.  She is scheduled to follow-up with her orthopedist this week.  She was given prescription for trial of prednisone tapering dose to see if this would help with any inflammation of her hip.  Patient will continue with physical therapy.  She may continue with Tylenol over-the-counter as needed.  If symptoms persist we may consider trial of gabapentin or Lyrica         Relevant Medications    predniSONE 20 mg tablet           Chief Complaint     Chief Complaint   Patient presents with    Hip Pain     GETTING WORSE        History of Present Illness     Patient in the office to review chronic medical condition.  She has been having continued difficulties with pain along her left hip area that radiates to her knee.  She had total left knee replacement surgery approximately 7 years ago.  She has been seen by orthopedist and has been trying physical therapy.  She does take Tylenol on occasion but continues to have symptoms.    Patient also question accuracy of her blood pressure monitor.  In the office her blood pressure appears stable however her Omron monitor was reading higher in the 140s over 80s    Hip Pain         Review of Systems   Review of Systems   Constitutional: Negative.    Respiratory: Negative.     Cardiovascular: Negative.    Gastrointestinal: Negative.    Genitourinary:  Negative.    Musculoskeletal:  Positive for arthralgias and gait problem.   Psychiatric/Behavioral: Negative.         Active Problem List     Patient Active Problem List   Diagnosis    Benign essential hypertension    Carcinoma of thyroid (HCC)    Hyperlipidemia    Subclinical hyperthyroidism    History of thyroid cancer    Tachycardia    Pain of left hip       Past Medical History:  Past Medical History:   Diagnosis Date    Carcinoma of thyroid (HCC)     Last Assessed:  8/29/17       Past Surgical History:  Past Surgical History:   Procedure Laterality Date    DIAGNOSTIC LAPAROSCOPY      EXPLORATORY LAPAROTOMY      SALPINGOOPHORECTOMY Left     TOTAL THYROIDECTOMY      TUBAL LIGATION         Family History:  Family History   Problem Relation Age of Onset    Colon cancer Mother 72    Heart disease Father     Other Father         Stroke syndrome    Thyroid cancer Sister 48    No Known Problems Sister     No Known Problems Sister     No Known Problems Sister     No Known Problems Sister     No Known Problems Sister     No Known Problems Sister     No Known Problems Daughter     No Known Problems Maternal Grandmother     No Known Problems Maternal Grandfather     No Known Problems Paternal Grandmother     No Known Problems Paternal Grandfather     Colon cancer Brother         age dx unknown    Brain cancer Son         passed as child    No Known Problems Maternal Aunt     No Known Problems Maternal Aunt     No Known Problems Paternal Aunt     No Known Problems Paternal Aunt        Social History:  Social History     Socioeconomic History    Marital status: /Civil Union     Spouse name: Not on file    Number of children: Not on file    Years of education: Not on file    Highest education level: Not on file   Occupational History    Occupation: retired   Tobacco Use    Smoking status: Never    Smokeless tobacco: Never   Vaping Use    Vaping status: Never Used   Substance and Sexual Activity    Alcohol use: Yes      Comment: Social drinker    Drug use: No    Sexual activity: Yes     Partners: Male     Birth control/protection: Post-menopausal   Other Topics Concern    Not on file   Social History Narrative    Caffeine use    Uses safety equipment - seatbelts     Social Determinants of Health     Financial Resource Strain: Low Risk  (9/29/2023)    Overall Financial Resource Strain (CARDIA)     Difficulty of Paying Living Expenses: Not hard at all   Food Insecurity: Not on file   Transportation Needs: No Transportation Needs (9/29/2023)    PRAPARE - Transportation     Lack of Transportation (Medical): No     Lack of Transportation (Non-Medical): No   Physical Activity: Not on file   Stress: Not on file   Social Connections: Not on file   Intimate Partner Violence: Not on file   Housing Stability: Not on file       Objective     Vitals:    04/30/24 0929   BP: 150/70   Pulse: 100   Resp: 18   Temp: 98 °F (36.7 °C)   SpO2: 96%     Wt Readings from Last 3 Encounters:   04/30/24 75.9 kg (167 lb 4 oz)   10/25/23 79.3 kg (174 lb 12.8 oz)   10/06/23 78.5 kg (173 lb)       Physical Exam  Constitutional:       General: She is not in acute distress.     Appearance: Normal appearance. She is not ill-appearing.   Musculoskeletal:         General: No swelling, tenderness or signs of injury.      Right lower leg: No edema.      Left lower leg: No edema.      Comments: Patient with no specific tenderness to palpation of vertebral spine or greater trochanter.  Muscle strength +5/5 lower extremities.  Negative straight leg raising.  Patient can walk independently but has a limp due to feeling of weakness of her left hip area.  She has fairly well-preserved range of motion of lumbar spine   Neurological:      Mental Status: She is alert. Mental status is at baseline.   Psychiatric:         Mood and Affect: Mood normal.         Behavior: Behavior normal.         Thought Content: Thought content normal.         Judgment: Judgment normal.  "        Pertinent Laboratory/Diagnostic Studies:  Lab Results   Component Value Date    GLUCOSE 94 08/13/2015    BUN 27 (H) 10/12/2023    CREATININE 0.84 10/12/2023    CALCIUM 8.8 10/12/2023     08/13/2015    K 4.4 10/12/2023    CO2 26 10/12/2023     10/12/2023     Lab Results   Component Value Date    ALT 21 10/12/2023    AST 18 10/12/2023    ALKPHOS 86 10/12/2023    BILITOT 0.53 08/13/2015       Lab Results   Component Value Date    WBC 6.39 10/12/2023    HGB 12.5 10/12/2023    HCT 39.0 10/12/2023    MCV 93 10/12/2023     10/12/2023       No results found for: \"TSH\"    Lab Results   Component Value Date    CHOL 200 08/13/2015     Lab Results   Component Value Date    TRIG 78 10/12/2023     Lab Results   Component Value Date    HDL 63 10/12/2023     Lab Results   Component Value Date    LDLCALC 105 (H) 10/12/2023     No results found for: \"HGBA1C\"    Results for orders placed or performed in visit on 03/28/24   T4, free   Result Value Ref Range    Free T4 1.09 0.61 - 1.12 ng/dL       No orders of the defined types were placed in this encounter.      ALLERGIES:  No Known Allergies    Current Medications     Current Outpatient Medications   Medication Sig Dispense Refill    amoxicillin (AMOXIL) 500 mg capsule TAKE 4 CAPSULES 1 HOUR PRIOR TO DENTAL TREATMENT      Glucosamine-Chondroit-Vit C-Mn (TH GLUCOSAMINE-CHONDROITIN) CAPS Take by mouth      levothyroxine (Euthyrox) 75 mcg tablet Take 1 tablet (75 mcg total) by mouth daily 90 tablet 1    losartan (COZAAR) 100 MG tablet TAKE 1 TABLET DAILY 90 tablet 0    MULTIPLE VITAMINS PO Take by mouth      Omega-3 Fatty Acids (FISH OIL) 1,000 mg Take by mouth      pravastatin (PRAVACHOL) 40 mg tablet TAKE 1 TABLET DAILY 90 tablet 0    predniSONE 20 mg tablet 2 TABS DAILY x 3 DAYS, 1.5 TABS DAILY x 3 DAYS, 1 TAB DAILY x 3 DAYS, 0.5 TAB DAILY X 3 DAYS 15 tablet 0    Red Yeast Rice 600 MG TABS Take by mouth      levothyroxine (Euthyrox) 100 mcg tablet Take 1 " tablet (100 mcg total) by mouth daily in the early morning (Patient not taking: Reported on 4/30/2024) 90 tablet 3    levothyroxine (Euthyrox) 75 mcg tablet Take 1 tablet (75 mcg total) by mouth daily (Patient not taking: Reported on 4/30/2024) 90 tablet 3    levothyroxine (Euthyrox) 88 mcg tablet Take 1 tablet (88 mcg total) by mouth daily (Patient not taking: Reported on 4/30/2024) 90 tablet 3     No current facility-administered medications for this visit.         Health Maintenance     Health Maintenance   Topic Date Due    Hepatitis C Screening  Never done    SLP PLAN OF CARE  Never done    Osteoporosis Screening  Never done    Zoster Vaccine (2 of 3) 12/26/2016    COVID-19 Vaccine (5 - 2023-24 season) 09/01/2023    PT PLAN OF CARE  04/26/2024    Urinary Incontinence Screening  10/06/2024    Medicare Annual Wellness Visit (AWV)  10/06/2024    Fall Risk  03/27/2025    Depression Screening  04/30/2025    Breast Cancer Screening: Mammogram  05/26/2025    Colorectal Cancer Screening  10/11/2026    Pneumococcal Vaccine: 65+ Years  Completed    Influenza Vaccine  Completed    HIB Vaccine  Aged Out    IPV Vaccine  Aged Out    Hepatitis A Vaccine  Aged Out    Meningococcal ACWY Vaccine  Aged Out    HPV Vaccine  Aged Out     Immunization History   Administered Date(s) Administered    COVID-19 PFIZER VACCINE 0.3 ML IM 02/03/2021, 02/24/2021, 10/30/2021    COVID-19 Pfizer Vac BIVALENT Darryl-sucrose 12 Yr+ IM 10/24/2022    INFLUENZA 10/26/2018, 10/25/2019, 10/05/2022    Influenza Split High Dose Preservative Free IM 10/25/2019    Influenza, high dose seasonal 0.7 mL 10/01/2020, 10/04/2021, 10/05/2022, 10/06/2023    Influenza, seasonal, injectable, preservative free 10/25/2019    Pneumococcal Conjugate 13-Valent 04/12/2017    Pneumococcal Polysaccharide PPV23 05/29/2018    Tdap 11/12/2019    Zoster 10/31/2016       Todd Pleitez MD    I spent 30 minutes with this patient of which greater than 50% was spent counseling or  reviewing chart

## 2024-05-01 ENCOUNTER — OFFICE VISIT (OUTPATIENT)
Dept: PHYSICAL THERAPY | Facility: REHABILITATION | Age: 74
End: 2024-05-01
Payer: MEDICARE

## 2024-05-01 DIAGNOSIS — M25.552 LEFT HIP PAIN: Primary | ICD-10-CM

## 2024-05-01 PROCEDURE — 97112 NEUROMUSCULAR REEDUCATION: CPT

## 2024-05-01 PROCEDURE — 97140 MANUAL THERAPY 1/> REGIONS: CPT

## 2024-05-01 PROCEDURE — 97110 THERAPEUTIC EXERCISES: CPT

## 2024-05-01 NOTE — PROGRESS NOTES
"Daily Note     Today's date: 2024  Patient name: Melyssa Payton  : 1950  MRN: [id number]  Referring provider: Jose Cm MD  Dx:   Encounter Diagnosis     ICD-10-CM    1. Left hip pain  M25.552           Start Time: 0800  Stop Time: 0845  Total time in clinic (min): 45 minutes    Subjective: Melyssa says she didn't feel bad after last session. She started prednisone last night, and feels much better today.       Objective: See treatment diary below      Assessment: Tolerated treatment well. Resumed STM of lateral hip musculature with symptom referral to anterior groin during. Continued with strengthening interventions for hip girdle musculature with good response. Incorporated short duration holds on bridges to increase stress across lateral hip musculature. Provided verbal cueing to avoid side bending compensation during ball isometric exercise with fair response. Lateral trunk bend compensation is due to hip abductor strength and coordination deficit. Incorporated arlin walking to improve gait quality and single limb stability. Patient demonstrated fatigue post treatment and exhibited good technique with therapeutic exercises      Plan: Continue per plan of care.      Precautions: Hx L. TKA 2017. Hx thyroid cancer.       Manuals 5/1 4/1 4/4 4/9 4/11 4/15 4/18 4/22 4/25 4/29   Caudo lateral distraction Supine gr III-IV into ER/ Flex Prom glide  AFB 2x10 with ER Supine gr 3-4 LM Supine gr 3-4 LM   Supine gr III-IV into ER Supine gr III-IV into ER Supine gr III-IV into ER/ Flex   ER contract/relax   2 bouts 5x5\"          PA mobs    Gr 3-4 LM prone with ER Gr 3-4 lm prone with ER.  Gr 3-4 LM prone  with ER Gr 3-4 prone with ER      Assessment   QD  LM  LM LM     STM L. Glute med LM    L. Hip flex LM L. Hip flex LM  L. Glute med LM L glute med LM NP   Neuro Re-Ed   4/4 4/9 4/11  4/18   4/29   Sand dune march  Nv?           Balance board  Taps  2x10 ea  Taps x2' ea  Taps x 2' ea  Taps x 2' lat " "    Hip extension      3x10 at wall with slider        Captain ross 6x30\" ea ball  Vc & TC        No ball 2x45\" ea  With ball 2x45\" ea Ball 4x30\" ea VC                                          Ther Ex   4/4 4/9         Bridge 3x10 RHB 2\" hold 3\"  2x10 2x10x3\" YHB 3x10 3\" hold YHB 3x10 3\" hold YHB 3x10 3\" hold YHB increase NV RHB 3x10 RHB RHB 3x10  RHB 3x10 3x10 RHB   clamshell  S/L  3\"x10  Pain!,  supine  YHB  3\"x10 15x no band pillow between knees 2x10 R s/l pillow between knees Np- hold( p) Np       Lateral walks  At bar  3 laps  YHB 3 laps at counter Np YHB 3 laps at counter YHB 3 laps at counter YHB 3 laps at counter     VG L7 5' YHB L5 -7'  L6 6'   L6 6' L7 5' L7 5'  L7 4' YHB   Slump tensioner  2x10  Np         Hip abd iso   10x5\" L side          KENNETH    Left leg back, counter support 1x10 HEP        Supine march SLR 3x10 weight poss NV    1x10 MRE, Light orange 2x8 on thigh  HEP- no resistance 3x10 light orange on thigh HEP 2x10 light orange on thigh-  Knee in ~20 deg ext SLR 2x8 SLR 3x8 SLR 3x10   Leg extension     Gtb 1x10 increase nv 10# 3x10 10# 3x10 10# 3x10     Flexion in supine       2x2' D/c     Step ups Lateral 2x10 4\"  2x10 6\"       Lateral 2x8 4\" Lateral 2x8 4\" ea Lateral 3x8 4\" ea.    Ther Activity                                       Gait Training             Kenneth walk 4 hurdles, 4 laps VC                         Modalities                                                            "

## 2024-05-07 ENCOUNTER — OFFICE VISIT (OUTPATIENT)
Dept: PHYSICAL THERAPY | Facility: REHABILITATION | Age: 74
End: 2024-05-07
Payer: MEDICARE

## 2024-05-07 DIAGNOSIS — M25.552 LEFT HIP PAIN: Primary | ICD-10-CM

## 2024-05-07 PROCEDURE — 97116 GAIT TRAINING THERAPY: CPT

## 2024-05-07 PROCEDURE — 97110 THERAPEUTIC EXERCISES: CPT

## 2024-05-07 PROCEDURE — 97112 NEUROMUSCULAR REEDUCATION: CPT

## 2024-05-07 NOTE — PROGRESS NOTES
"Daily Note     Today's date: 2024  Patient name: Melyssa Payton  : 1950  MRN: [id number]  Referring provider: Jose Cm MD  Dx:   Encounter Diagnosis     ICD-10-CM    1. Left hip pain  M25.552           Start Time: 801  Stop Time: 845  Total time in clinic (min): 44 minutes    Subjective: Melyssa says her orthopedic surgeon suggested an injection, but she declined. She had been feeling good, but was in pain after her appointment from the quick movements.       Objective: See treatment diary below      Assessment: Tolerated treatment well. Hip ROM and muscular restrictions significantly improved. Duration of manual therapy reduced accordingly. Resumed isotonic strengthening of hip abductors with fair response, and mild onset of left knee symptoms. Patient continues to demonstrate trendelenburg gait and pelvic drop with left single limb stance. Patient demonstrated fatigue post treatment and would benefit from continued PT      Plan: Continue per plan of care.      Precautions: Hx L. TKA 2017. Hx thyroid cancer.       Manuals 5/1 5/7 4/4 4/9 4/11 4/15 4/18 4/22 4/25 4/29   Caudo lateral distraction Supine gr III-IV into ER/ Flex Supine gr IV into ER/ Flex LM 2x10 with ER Supine gr 3-4 LM Supine gr 3-4 LM   Supine gr III-IV into ER Supine gr III-IV into ER Supine gr III-IV into ER/ Flex   ER contract/relax   2 bouts 5x5\"          PA mobs    Gr 3-4 LM prone with ER Gr 3-4 lm prone with ER.  Gr 3-4 LM prone  with ER Gr 3-4 prone with ER      Assessment   QD  LM  LM LM     STM L. Glute med LM L. Glute med LM   L. Hip flex LM L. Hip flex LM  L. Glute med LM L glute med LM NP   Neuro Re-Ed   4/4 4/9 4/11  4/18   4/29   Sand [facility]             Balance board    Taps x2' ea  Taps x 2' ea  Taps x 2' lat     Hip extension      3x10 at wall with slider        Captain cody 6x30\" ea ball  Vc & TC 4x30\" ea        No ball 2x45\" ea  With ball 2x45\" ea Ball 4x30\" ea VC                                  " "        Ther Ex   4/4 4/9         Bridge 3x10 RHB 2\" hold 3x10 2\" hold  2x10x3\" YHB 3x10 3\" hold YHB 3x10 3\" hold YHB 3x10 3\" hold YHB increase NV RHB 3x10 RHB RHB 3x10  RHB 3x10 3x10 RHB   clamshell   15x no band pillow between knees 2x10 R s/l pillow between knees Np- hold( p) Np       Lateral walks  YHB 3 laps at counter  YHB 3 laps at counter Np YHB 3 laps at counter YHB 3 laps at counter YHB 3 laps at counter     VG L7 5' YHB L7 5' YHB  L6 6'   L6 6' L7 5' L7 5'  L7 4' YHB   Slump tensioner    Np         Hip abd iso   10x5\" L side          KENNETH    Left leg back, counter support 1x10 HEP        Supine march SLR 3x10 weight poss NV SLR 3x10 1#   1x10 MRE, Light orange 2x8 on thigh  HEP- no resistance 3x10 light orange on thigh HEP 2x10 light orange on thigh-  Knee in ~20 deg ext SLR 2x8 SLR 3x8 SLR 3x10   Leg extension     Gtb 1x10 increase nv 10# 3x10 10# 3x10 10# 3x10     Flexion in supine       2x2' D/c     Step ups Lateral 2x10 4\"  2x10 6\" Lateral 6\" 3x10      Lateral 2x8 4\" Lateral 2x8 4\" ea Lateral 3x8 4\" ea.    Ther Activity                                       Gait Training             Kenneth walk 4 hurdles, 4 laps VC 6 hurdles 5 laps + 5lbs RUE.                         Modalities                                                              "

## 2024-05-09 ENCOUNTER — OFFICE VISIT (OUTPATIENT)
Dept: PHYSICAL THERAPY | Facility: REHABILITATION | Age: 74
End: 2024-05-09
Payer: MEDICARE

## 2024-05-09 DIAGNOSIS — M25.552 LEFT HIP PAIN: Primary | ICD-10-CM

## 2024-05-09 PROCEDURE — 97112 NEUROMUSCULAR REEDUCATION: CPT

## 2024-05-09 PROCEDURE — 97116 GAIT TRAINING THERAPY: CPT

## 2024-05-09 PROCEDURE — 97110 THERAPEUTIC EXERCISES: CPT

## 2024-05-14 ENCOUNTER — HOSPITAL ENCOUNTER (OUTPATIENT)
Dept: BONE DENSITY | Facility: IMAGING CENTER | Age: 74
Discharge: HOME | End: 2024-05-14
Payer: MEDICARE

## 2024-05-14 VITALS — HEIGHT: 61 IN | WEIGHT: 163.8 LBS | BODY MASS INDEX: 30.93 KG/M2

## 2024-05-14 DIAGNOSIS — Z78.0 POST-MENOPAUSE: ICD-10-CM

## 2024-05-14 DIAGNOSIS — Z13.820 SCREENING FOR OSTEOPOROSIS: ICD-10-CM

## 2024-05-14 PROCEDURE — 77080 DXA BONE DENSITY AXIAL: CPT

## 2024-05-15 ENCOUNTER — OFFICE VISIT (OUTPATIENT)
Dept: PHYSICAL THERAPY | Facility: REHABILITATION | Age: 74
End: 2024-05-15
Payer: MEDICARE

## 2024-05-15 DIAGNOSIS — M25.552 LEFT HIP PAIN: Primary | ICD-10-CM

## 2024-05-15 PROCEDURE — 97110 THERAPEUTIC EXERCISES: CPT

## 2024-05-15 PROCEDURE — 97140 MANUAL THERAPY 1/> REGIONS: CPT

## 2024-05-15 PROCEDURE — 97112 NEUROMUSCULAR REEDUCATION: CPT

## 2024-05-15 NOTE — PROGRESS NOTES
"Daily Note     Today's date: 5/15/2024  Patient name: Melyssa Payton  : 1950  MRN: [id number]  Referring provider: Jose Cm MD  Dx:   Encounter Diagnosis     ICD-10-CM    1. Left hip pain  M25.552           Start Time: 0800  Stop Time: 08  Total time in clinic (min): 46 minutes    Subjective: Melyssa finished her corticosteroid on the  and doesn't think it helped much.       Objective: See treatment diary below      Assessment: Tolerated treatment well. Progressed patient to open chain hip abduction interventions with appropriate response. Patient has difficulty maintaining neutral hip position and tends to exhibit compensations through left TFL through LLE. Hip range of motion is roughly equal during session in both supine and prone. Held on mobilizations due to improved motion through left hip, and lack of symptoms with PROM.Assessed soft tissue after treatment for potential irritation without symptom reproduction. Patient demonstrated fatigue post treatment and would benefit from continued PT.       Plan: Continue per plan of care.      Precautions: Hx L. TKA 2017. Hx thyroid cancer.       Manuals 5/1 5/7 5/9 5/15 4/11 4/15 4/18 4/22 4/25 4/29   Caudo lateral distraction Supine gr III-IV into ER/ Flex Supine gr IV into ER/ Flex LM Np  Supine gr 3-4 LM   Supine gr III-IV into ER Supine gr III-IV into ER Supine gr III-IV into ER/ Flex   PA mobs     Gr 3-4 lm prone with ER.  Gr 3-4 LM prone  with ER Gr 3-4 prone with ER      Assessment    LM LM  LM LM     STM L. Glute med LM L. Glute med LM L glute med LM L. TFL LM L. Hip flex LM L. Hip flex LM  L. Glute med LM L glute med LM NP   Neuro Re-Ed     4/11  4/18   4/29   Sand [facility]             Balance board      Taps x 2' ea  Taps x 2' lat     Hip extension      3x10 at wall with slider        Captain cody 6x30\" ea ball  Vc & TC 4x30\" ea  4x30\" ea vc 4x30\" Hand assist only     No ball 2x45\" ea  With ball 2x45\" ea Ball 4x30\" ea VC " "  S/l hip abd   Poss nv 2x8 3\" hold                                   Ther Ex             Bridge 3x10 RHB 2\" hold 3x10 2\" hold  RHB 3x10 2-3\" hold 3x10 BHB 3x10 3\" hold YHB 3x10 3\" hold YHB increase NV RHB 3x10 RHB RHB 3x10  RHB 3x10 3x10 RHB   clamshell     Np- hold( p) Np       Lateral walks  YHB 3 laps at counter YHB 3 laps at counter  YHB 3 laps at counter Np YHB 3 laps at counter YHB 3 laps at counter YHB 3 laps at counter     VG L7 5' YHB L7 5' YHB L7 5' YHB L7 5' YHB end  L6 6' L7 5' L7 5'  L7 4' YHB   KENNETH     HEP        Supine march SLR 3x10 weight poss NV SLR 3x10 1# SLR 3x10 1#- standing march poss NV SLR 3x12 1# supine 1x10 MRE, Light orange 2x8 on thigh  HEP- no resistance 3x10 light orange on thigh HEP 2x10 light orange on thigh-  Knee in ~20 deg ext SLR 2x8 SLR 3x8 SLR 3x10   Leg extension     Gtb 1x10 increase nv 10# 3x10 10# 3x10 10# 3x10     Step ups Lateral 2x10 4\"  2x10 6\" Lateral 6\" 3x10 Lateral 6 \" 3x10 Lateral 6\" 3x10    Lateral 2x8 4\" Lateral 2x8 4\" ea Lateral 3x8 4\" ea.    Ther Activity                                       Gait Training             Kenneth walk 4 hurdles, 4 laps VC 6 hurdles 5 laps + 5lbs RUE.  6 hurdles 3 laps R & L hands 6# Np                      Modalities                                                                  "

## 2024-05-16 ENCOUNTER — TELEPHONE (OUTPATIENT)
Dept: OBGYN CLINIC | Facility: CLINIC | Age: 74
End: 2024-05-16

## 2024-05-16 NOTE — TELEPHONE ENCOUNTER
I left a message for pt - has a bit of bone loss - osteopenia - . Should discuss with pcp if he wants any treatment. Pt needs 1200 mg calcium daily and 600 of vitamin d. Exercise important. To call pcp for his perspective on any treatment.  patients last visit here was in 7/9/20.

## 2024-05-16 NOTE — TELEPHONE ENCOUNTER
----- Message from Lena Mesa MD sent at 5/16/2024  2:40 PM EDT -----  Please call Melyssa Payton regarding her abnormal result.I recommend that she discuss this with her PCP, give her prednisone use

## 2024-05-17 ENCOUNTER — OFFICE VISIT (OUTPATIENT)
Dept: PHYSICAL THERAPY | Facility: REHABILITATION | Age: 74
End: 2024-05-17
Payer: MEDICARE

## 2024-05-17 DIAGNOSIS — M25.552 LEFT HIP PAIN: Primary | ICD-10-CM

## 2024-05-17 PROCEDURE — 97110 THERAPEUTIC EXERCISES: CPT

## 2024-05-17 PROCEDURE — 97112 NEUROMUSCULAR REEDUCATION: CPT

## 2024-05-17 PROCEDURE — 97140 MANUAL THERAPY 1/> REGIONS: CPT

## 2024-05-17 NOTE — PROGRESS NOTES
Daily Note     Today's date: 2024  Patient name: Melyssa Payton  : 1950  MRN: [id number]  Referring provider: Jose Cm MD  Dx:   Encounter Diagnosis     ICD-10-CM    1. Left hip pain  M25.552           Start Time: 847  Stop Time: 934  Total time in clinic (min): 47 minutes    Subjective: Melyssa says she still gets pain and numbness into her knee sometimes.       Objective: See treatment diary below    SLR: negative bilaterally  SLUMP: positive on the right for motion restriction, negative on the left  Slump fibular bias: negative bilaterally  Slump sural bias: positive on the left for restriction  Femoral nerve tension: negative bilaterally    Assessment: Functional mobility and symptom intensity is gradually improving. Patient continues to exhibit waddling gait with limited lumbo pelvic dissociation, but quality is gradually improving. Patient experiences no improvement in gait after lumbar interventions or hip directed manual interventions performed today. Patient reports no change in numbness through anterior knee. While numbness not provoked with neurodynamic testing, incorporated neurodynamic interventions to address restrictions. Reassess next session. Progressed hip flexion strength interventions with good response. Tolerated treatment well. Patient would benefit from continued PT      Plan: Continue per plan of care.      Precautions: Hx L. TKA 2017. Hx thyroid cancer.       Manuals 5/1 5/7 5/9 5/15 5/17 4/15 4/18 4/22 4/25 4/29   Caudo lateral distraction Supine gr III-IV into ER/ Flex Supine gr IV into ER/ Flex LM Np     Supine gr III-IV into ER Supine gr III-IV into ER Supine gr III-IV into ER/ Flex   PA mobs     Gr III-IV LM prone Gr 3-4 LM prone  with ER Gr 3-4 prone with ER      Assessment    LM LM  LM LM     STM L. Glute med LM L. Glute med LM L glute med LM L. TFL LM  L. Hip flex LM  L. Glute med LM L glute med LM NP   Neuro Re-Ed       4/18   4/29   Sand dune march     "         Balance board      Taps x 2' ea  Taps x 2' lat     Hip extension      3x10 at wall with slider        Captain cody 6x30\" ea ball  Vc & TC 4x30\" ea  4x30\" ea vc 4x30\" Hand assist only Np    No ball 2x45\" ea  With ball 2x45\" ea Ball 4x30\" ea VC   S/l hip abd   Poss nv 2x8 3\" hold 3x8  hold                                  Ther Ex             LTR      X30        Nerve tensioning     X30 L. Sural        Bridge 3x10 RHB 2\" hold 3x10 2\" hold  RHB 3x10 2-3\" hold 3x10 BHB 3x10 BHB 3x10 3\" hold YHB increase NV RHB 3x10 RHB RHB 3x10  RHB 3x10 3x10 RHB   clamshell      Np       Lateral walks  YHB 3 laps at counter YHB 3 laps at counter  YHB 3 laps at counter YHB 4 laps at counter YHB 3 laps at counter YHB 3 laps at counter YHB 3 laps at counter     VG L7 5' YHB L7 5' YHB L7 5' YHB L7 5' YHB end L7 5' YHB end L6 6' L7 5' L7 5'  L7 4' YHB   KENNETH             Supine march SLR 3x10 weight poss NV SLR 3x10 1# SLR 3x10 1#- standing march poss NV SLR 3x12 1# supine Standing hip flexion 3x8 OMB 3x10 light orange on thigh HEP 2x10 light orange on thigh-  Knee in ~20 deg ext SLR 2x8 SLR 3x8 SLR 3x10   Leg extension      10# 3x10 10# 3x10 10# 3x10     Step ups Lateral 2x10 4\"  2x10 6\" Lateral 6\" 3x10 Lateral 6 \" 3x10 Lateral 6\" 3x10 Lateral 6\" 3x10   Lateral 2x8 4\" Lateral 2x8 4\" ea Lateral 3x8 4\" ea.    Ther Activity                                       Gait Training             Kenneth walk 4 hurdles, 4 laps VC 6 hurdles 5 laps + 5lbs RUE.  6 hurdles 3 laps R & L hands 6# Np                      Modalities                                                                    "

## 2024-05-22 ENCOUNTER — OFFICE VISIT (OUTPATIENT)
Dept: PHYSICAL THERAPY | Facility: REHABILITATION | Age: 74
End: 2024-05-22
Payer: MEDICARE

## 2024-05-22 DIAGNOSIS — M25.552 LEFT HIP PAIN: Primary | ICD-10-CM

## 2024-05-22 PROCEDURE — 97140 MANUAL THERAPY 1/> REGIONS: CPT

## 2024-05-22 PROCEDURE — 97110 THERAPEUTIC EXERCISES: CPT

## 2024-05-24 ENCOUNTER — OFFICE VISIT (OUTPATIENT)
Dept: PHYSICAL THERAPY | Facility: REHABILITATION | Age: 74
End: 2024-05-24
Payer: MEDICARE

## 2024-05-24 DIAGNOSIS — M25.552 LEFT HIP PAIN: Primary | ICD-10-CM

## 2024-05-24 PROCEDURE — 97110 THERAPEUTIC EXERCISES: CPT

## 2024-05-24 PROCEDURE — 97140 MANUAL THERAPY 1/> REGIONS: CPT

## 2024-05-24 NOTE — PROGRESS NOTES
"Daily Note     Today's date: 2024  Patient name: Melyssa Payton  : 1950  MRN: [id number]  Referring provider: Jose Cm MD  Dx:   Encounter Diagnosis     ICD-10-CM    1. Left hip pain  M25.552           Start Time: 1020  Stop Time: 1100  Total time in clinic (min): 40 minutes    Subjective: Melyssa says she has had no change with her numbness in her left knee. She felt fine after last visit, but did gardening and was really sore after that because it involved a lot of bending, and low sitting.       Objective: See treatment diary below      Assessment: Despite reported numbness, patient denies paresthesias and has no observable sensation deficit with light touch. Continued with current POC to improve strength and functional mobility with good response. Tolerated treatment well. Patient demonstrates bilateral lower extremity strength deficits during 1/2 kneel and requires use of upper extremities to stand. Patient demonstrated fatigue post treatment, exhibited good technique with therapeutic exercises, and would benefit from continued PT      Plan: Continue per plan of care.      Precautions: Hx L. TKA 2017. Hx thyroid cancer.       Manuals 5/1 5/7 5/9 5/15 5/17 5/22 5/24 4/22 4/25 4/29   Caudo lateral distraction Supine gr III-IV into ER/ Flex Supine gr IV into ER/ Flex LM Np     Supine gr III-IV into ER Supine gr III-IV into ER Supine gr III-IV into ER/ Flex   PA mobs     Gr III-IV LM prone Gr III-IV LM prone RLE with ER Gr III-IV LM prone RLE with ER.       Assessment    LM LM LM  LM     STM L. Glute med LM L. Glute med LM L glute med LM L. TFL LM    L. Glute med LM L glute med LM NP   Neuro Re-Ed             [facility]             Balance board        Taps x 2' lat     Hip extension             Captain cody 6x30\" ea ball  Vc & TC 4x30\" ea  4x30\" ea vc 4x30\" Hand assist only Np    No ball 2x45\" ea  With ball 2x45\" ea Ball 4x30\" ea VC   S/l hip abd   Poss nv 2x8 3\" hold 3x8  " "hold 3x8  3x8                                 Ther Ex             LTR      X30        Nerve tensioning     X30 L. Sural - HEP       Bridge 3x10 RHB 2\" hold 3x10 2\" hold  RHB 3x10 2-3\" hold 3x10 BHB 3x10 BHB 3x10 BHB 3x10 BHB RHB 3x10  RHB 3x10 3x10 RHB   clamshell             Lateral walks  YHB 3 laps at counter YHB 3 laps at counter  YHB 3 laps at counter YHB 4 laps at counter RHB 3 laps at counter RHB 3 laps at counter.  YHB 3 laps at counter     VG L7 5' YHB L7 5' YHB L7 5' YHB L7 5' YHB end L7 5' YHB end L& 5' YHB end L7 5' RHB end L7 5'  L7 4' YHB   KENNETH             Supine march SLR 3x10 weight poss NV SLR 3x10 1# SLR 3x10 1#- standing march poss NV SLR 3x12 1# supine Standing hip flexion 3x8 OMB Standing hip flexion 3x8 OMB Staning 3x8 OMB SLR 2x8 SLR 3x8 SLR 3x10   Leg extension        10# 3x10     Step ups Lateral 2x10 4\"  2x10 6\" Lateral 6\" 3x10 Lateral 6 \" 3x10 Lateral 6\" 3x10 Lateral 6\" 3x10 Lateral 6\" 2x8 8# Lateral 6\" 3x8 8# Lateral 2x8 4\" Lateral 2x8 4\" ea Lateral 3x8 4\" ea.    Ther Activity                                       Gait Training             Kenneth walk 4 hurdles, 4 laps VC 6 hurdles 5 laps + 5lbs RUE.  6 hurdles 3 laps R & L hands 6# Np                      Modalities                                                                        "

## 2024-05-29 ENCOUNTER — OFFICE VISIT (OUTPATIENT)
Dept: PHYSICAL THERAPY | Facility: REHABILITATION | Age: 74
End: 2024-05-29
Payer: MEDICARE

## 2024-05-29 DIAGNOSIS — I10 ESSENTIAL HYPERTENSION: ICD-10-CM

## 2024-05-29 DIAGNOSIS — M25.552 LEFT HIP PAIN: Primary | ICD-10-CM

## 2024-05-29 DIAGNOSIS — E78.5 HYPERLIPIDEMIA, UNSPECIFIED HYPERLIPIDEMIA TYPE: ICD-10-CM

## 2024-05-29 PROCEDURE — 97110 THERAPEUTIC EXERCISES: CPT

## 2024-05-29 PROCEDURE — 97140 MANUAL THERAPY 1/> REGIONS: CPT

## 2024-05-29 NOTE — PROGRESS NOTES
Re-Evaluation/ Daily note    Today's date: 2024  Patient name: Melyssa Payton  : 1950  MRN: [id number]  Referring provider: Jose Cm MD  Dx:   Encounter Diagnosis     ICD-10-CM    1. Left hip pain  M25.552           Start Time: 0800  Stop Time: 0845  Total time in clinic (min): 45 minutes    Subjective: Melyssa says she had her left thigh pain yesterday, but wasn't doing anything.       Objective: See treatment diary below    Pain  Current pain ratin  At best pain ratin  At worst pain ratin    Pain rolling over in bed. Pain ascending steps reciprocally    GROC  60% improvement.     Goals  Short Term Goals:   1. Patient will demonstrate independence with HEP by providing return demonstration of exercises- met  2. Patient will report decreased symptom intensity during activity by 50%- 50% met  3. Patient will perform 5 sit to stands without symptom increase- met  4. Patient will ascend half flight of steps without onset of symptoms- 50% met  5. Patient will turn in bed with mild onset of symptoms- 50% met    Long Term Goals:   1. Patient will improve FOTO to greater then goal- not captured  2. Patient will improve pain with activity to 2/10 or less- no progress  3. Patient will continue with HEP to allow for continued progress and function- 50% met  4. Patient will reduce 5x sit to stand time by 6 seconds to reflect improvement in strength and decrease falls risk- met  5. Patient will ascend multiple flights of steps with mild onset of symptoms.- 25% met              MMT         AROM          PROM    Hip       L       R        L           R      L     R   Flex. (L1,L2,L3) 4- 3+ 100 110 110  110   Extn. 3+ 3+ 10 10     Abd. 2+ 2+   40    Add     20    IR. 4 4 30 30 30 30   ER. 4 4- 15 35 50 50   Knee         Flex 4+ 4+       Ext 4+ 4+           5xSTS IE: 20 seconds   5xSTS RE: 16 seconds  5xSTS RE: 14 seconds      Assessment: Patient reports 60% improvement at this time, with most  "improvement in functional mobility. However, patient continues to express difficulties with pain, strength, rolling.  Patient has made progress towards their goals and has shown improvement throughout PT by demonstrating decreased pain, increased range of motion, increased strength, and improved tolerance to activity.  Patient continues to present with pain, decreased strength, and decreased tolerance to activity. Melyssa would benefit from continued physical therapy to further improve their strength, range of motion and activity tolerance to maximize function.Tolerated treatment well. Held on manual therapy due to equalized range of motion at session start. Progressed patient to sit to stands and lunges to improve strength and functional mobility with appropriate response. Initial reports of bilateral groin pain when initiating side step up interventions, but pain reduces with continued motion. Patient demonstrated fatigue post treatment, exhibited good technique with therapeutic exercises, and would benefit from continued PT      Plan: Continue per plan of care.      Precautions: Hx L. TKA 1/17/2017. Hx thyroid cancer.       Manuals 5/1 5/7 5/9 5/15 5/17 5/22 5/24 5/29 4/25 4/29   Caudo lateral distraction Supine gr III-IV into ER/ Flex Supine gr IV into ER/ Flex LM Np      Supine gr III-IV into ER Supine gr III-IV into ER/ Flex   PA mobs     Gr III-IV LM prone Gr III-IV LM prone RLE with ER Gr III-IV LM prone RLE with ER.       Assessment    LM LM LM  LM     STM L. Glute med LM L. Glute med LM L glute med LM L. TFL LM     L glute med LM NP   Neuro Re-Ed          4/29   [facility]             Balance board             Hip extension             Captain cody 6x30\" ea ball  Vc & TC 4x30\" ea  4x30\" ea vc 4x30\" Hand assist only Np    No ball 2x45\" ea  With ball 2x45\" ea Ball 4x30\" ea VC   S/l hip abd   Poss nv 2x8 3\" hold 3x8  hold 3x8  3x8  3x8 slider                               Ther Ex             LTR      " "X30        Nerve tensioning     X30 L. Sural - HEP       Bridge 3x10 RHB 2\" hold 3x10 2\" hold  RHB 3x10 2-3\" hold 3x10 BHB 3x10 BHB 3x10 BHB 3x10 BHB Np RHB 3x10 3x10 RHB   clamshell             Lateral walks  YHB 3 laps at counter YHB 3 laps at counter  YHB 3 laps at counter YHB 4 laps at counter RHB 3 laps at counter RHB 3 laps at counter.  RHB 3 laps at counter     VG L7 5' YHB L7 5' YHB L7 5' YHB L7 5' YHB end L7 5' YHB end L& 5' YHB end L7 5' RHB end L7 5' RHB  L7 4' YHB   KENNETH             Supine march SLR 3x10 weight poss NV SLR 3x10 1# SLR 3x10 1#- standing march poss NV SLR 3x12 1# supine Standing hip flexion 3x8 OMB Standing hip flexion 3x8 OMB Staning 3x8 OMB Standing 3x8 OMB SLR 3x8 SLR 3x10   Leg extension             Step ups Lateral 2x10 4\"  2x10 6\" Lateral 6\" 3x10 Lateral 6 \" 3x10 Lateral 6\" 3x10 Lateral 6\" 3x10 Lateral 6\" 2x8 8# Lateral 6\" 3x8 8# Lateral 6\" 3x8 8# Lateral 2x8 4\" ea Lateral 3x8 4\" ea.    Sit to stand        4x5     Lunge        Mini 2x5 UE support at mirror.      Ther Activity                                       Gait Training             Kenneth walk 4 hurdles, 4 laps VC 6 hurdles 5 laps + 5lbs RUE.  6 hurdles 3 laps R & L hands 6# Np                      Modalities                                                                          "

## 2024-05-30 RX ORDER — LOSARTAN POTASSIUM 100 MG/1
TABLET ORAL
Qty: 90 TABLET | Refills: 0 | Status: SHIPPED | OUTPATIENT
Start: 2024-05-30

## 2024-05-30 RX ORDER — PRAVASTATIN SODIUM 40 MG
TABLET ORAL
Qty: 90 TABLET | Refills: 0 | Status: SHIPPED | OUTPATIENT
Start: 2024-05-30

## 2024-05-31 ENCOUNTER — APPOINTMENT (OUTPATIENT)
Dept: PHYSICAL THERAPY | Facility: REHABILITATION | Age: 74
End: 2024-05-31
Payer: MEDICARE

## 2024-06-28 ENCOUNTER — HOSPITAL ENCOUNTER (OUTPATIENT)
Dept: MAMMOGRAPHY | Facility: MEDICAL CENTER | Age: 74
Discharge: HOME | End: 2024-06-28
Payer: MEDICARE

## 2024-06-28 VITALS — BODY MASS INDEX: 32 KG/M2 | HEIGHT: 60 IN | WEIGHT: 163 LBS

## 2024-06-28 DIAGNOSIS — Z12.31 ENCOUNTER FOR SCREENING MAMMOGRAM FOR MALIGNANT NEOPLASM OF BREAST: ICD-10-CM

## 2024-06-28 PROCEDURE — 77067 SCR MAMMO BI INCL CAD: CPT

## 2024-06-28 PROCEDURE — 77063 BREAST TOMOSYNTHESIS BI: CPT

## 2024-09-13 DIAGNOSIS — I10 ESSENTIAL HYPERTENSION: ICD-10-CM

## 2024-09-13 DIAGNOSIS — E78.5 HYPERLIPIDEMIA, UNSPECIFIED HYPERLIPIDEMIA TYPE: ICD-10-CM

## 2024-09-13 RX ORDER — PRAVASTATIN SODIUM 40 MG
TABLET ORAL
Qty: 90 TABLET | Refills: 1 | Status: SHIPPED | OUTPATIENT
Start: 2024-09-13

## 2024-09-13 RX ORDER — LOSARTAN POTASSIUM 100 MG/1
TABLET ORAL
Qty: 90 TABLET | Refills: 1 | Status: SHIPPED | OUTPATIENT
Start: 2024-09-13

## 2024-09-25 ENCOUNTER — TELEPHONE (OUTPATIENT)
Age: 74
End: 2024-09-25

## 2024-09-25 NOTE — TELEPHONE ENCOUNTER
Patient calling in and asking if Dr. Patel would like patient to have any blood work done before her appt at the end of October     If he does patient asking that we mail lab order to her so she can get them completed

## 2024-10-07 ENCOUNTER — OFFICE VISIT (OUTPATIENT)
Dept: FAMILY MEDICINE CLINIC | Facility: CLINIC | Age: 74
End: 2024-10-07
Payer: MEDICARE

## 2024-10-07 VITALS
OXYGEN SATURATION: 99 % | WEIGHT: 166 LBS | RESPIRATION RATE: 16 BRPM | HEART RATE: 77 BPM | BODY MASS INDEX: 32.59 KG/M2 | TEMPERATURE: 98 F | SYSTOLIC BLOOD PRESSURE: 142 MMHG | DIASTOLIC BLOOD PRESSURE: 88 MMHG | HEIGHT: 60 IN

## 2024-10-07 DIAGNOSIS — I10 BENIGN ESSENTIAL HYPERTENSION: ICD-10-CM

## 2024-10-07 DIAGNOSIS — E78.5 HYPERLIPIDEMIA, UNSPECIFIED HYPERLIPIDEMIA TYPE: ICD-10-CM

## 2024-10-07 DIAGNOSIS — Z00.00 MEDICARE ANNUAL WELLNESS VISIT, SUBSEQUENT: Primary | ICD-10-CM

## 2024-10-07 DIAGNOSIS — C73 CARCINOMA OF THYROID (HCC): ICD-10-CM

## 2024-10-07 PROCEDURE — 99213 OFFICE O/P EST LOW 20 MIN: CPT | Performed by: FAMILY MEDICINE

## 2024-10-07 PROCEDURE — G0439 PPPS, SUBSEQ VISIT: HCPCS | Performed by: FAMILY MEDICINE

## 2024-10-07 NOTE — ASSESSMENT & PLAN NOTE
Hypertension.  The patient's blood pressure is stable at this time and he will continue current regimen of medications    Orders:    CBC; Future    Comprehensive metabolic panel; Future    Lipid panel; Future

## 2024-10-07 NOTE — ASSESSMENT & PLAN NOTE
History of thyroid cancer.  Patient sees her general surgeon later this month for surveillance of prior history of thyroid cancer.

## 2024-10-07 NOTE — ASSESSMENT & PLAN NOTE
Hyperlipidemia.  Patient will check lipid panel blood work and continue with current dose of statin therapy    Orders:    CBC; Future    Comprehensive metabolic panel; Future    Lipid panel; Future

## 2024-10-07 NOTE — PATIENT INSTRUCTIONS
Medicare Preventive Visit Patient Instructions  Thank you for completing your Welcome to Medicare Visit or Medicare Annual Wellness Visit today. Your next wellness visit will be due in one year (10/8/2025).  The screening/preventive services that you may require over the next 5-10 years are detailed below. Some tests may not apply to you based off risk factors and/or age. Screening tests ordered at today's visit but not completed yet may show as past due. Also, please note that scanned in results may not display below.  Preventive Screenings:  Service Recommendations Previous Testing/Comments   Colorectal Cancer Screening  * Colonoscopy    * Fecal Occult Blood Test (FOBT)/Fecal Immunochemical Test (FIT)  * Fecal DNA/Cologuard Test  * Flexible Sigmoidoscopy Age: 45-75 years old   Colonoscopy: every 10 years (may be performed more frequently if at higher risk)  OR  FOBT/FIT: every 1 year  OR  Cologuard: every 3 years  OR  Sigmoidoscopy: every 5 years  Screening may be recommended earlier than age 45 if at higher risk for colorectal cancer. Also, an individualized decision between you and your healthcare provider will decide whether screening between the ages of 76-85 would be appropriate. Colonoscopy: Not on file  FOBT/FIT: Not on file  Cologuard: 10/11/2023  Sigmoidoscopy: Not on file    Screening Current     Breast Cancer Screening Age: 40+ years old  Frequency: every 1-2 years  Not required if history of left and right mastectomy Mammogram: 06/28/2024    Screening Current   Cervical Cancer Screening Between the ages of 21-29, pap smear recommended once every 3 years.   Between the ages of 30-65, can perform pap smear with HPV co-testing every 5 years.   Recommendations may differ for women with a history of total hysterectomy, cervical cancer, or abnormal pap smears in past. Pap Smear: 07/09/2020    Screening Not Indicated   Hepatitis C Screening Once for adults born between 1945 and 1965  More frequently in  patients at high risk for Hepatitis C Hep C Antibody: Not on file        Diabetes Screening 1-2 times per year if you're at risk for diabetes or have pre-diabetes Fasting glucose: 88 mg/dL (10/12/2023)  A1C: No results in last 5 years (No results in last 5 years)  Screening Current   Cholesterol Screening Once every 5 years if you don't have a lipid disorder. May order more often based on risk factors. Lipid panel: 10/12/2023    Screening Not Indicated  History Lipid Disorder     Other Preventive Screenings Covered by Medicare:  Abdominal Aortic Aneurysm (AAA) Screening: covered once if your at risk. You're considered to be at risk if you have a family history of AAA.  Lung Cancer Screening: covers low dose CT scan once per year if you meet all of the following conditions: (1) Age 55-77; (2) No signs or symptoms of lung cancer; (3) Current smoker or have quit smoking within the last 15 years; (4) You have a tobacco smoking history of at least 20 pack years (packs per day multiplied by number of years you smoked); (5) You get a written order from a healthcare provider.  Glaucoma Screening: covered annually if you're considered high risk: (1) You have diabetes OR (2) Family history of glaucoma OR (3)  aged 50 and older OR (4)  American aged 65 and older  Osteoporosis Screening: covered every 2 years if you meet one of the following conditions: (1) You're estrogen deficient and at risk for osteoporosis based off medical history and other findings; (2) Have a vertebral abnormality; (3) On glucocorticoid therapy for more than 3 months; (4) Have primary hyperparathyroidism; (5) On osteoporosis medications and need to assess response to drug therapy.   Last bone density test (DXA Scan): 05/14/2024.  HIV Screening: covered annually if you're between the age of 15-65. Also covered annually if you are younger than 15 and older than 65 with risk factors for HIV infection. For pregnant patients, it is  covered up to 3 times per pregnancy.    Immunizations:  Immunization Recommendations   Influenza Vaccine Annual influenza vaccination during flu season is recommended for all persons aged >= 6 months who do not have contraindications   Pneumococcal Vaccine   * Pneumococcal conjugate vaccine = PCV13 (Prevnar 13), PCV15 (Vaxneuvance), PCV20 (Prevnar 20)  * Pneumococcal polysaccharide vaccine = PPSV23 (Pneumovax) Adults 19-65 yo with certain risk factors or if 65+ yo  If never received any pneumonia vaccine: recommend Prevnar 20 (PCV20)  Give PCV20 if previously received 1 dose of PCV13 or PPSV23   Hepatitis B Vaccine 3 dose series if at intermediate or high risk (ex: diabetes, end stage renal disease, liver disease)   Respiratory syncytial virus (RSV) Vaccine - COVERED BY MEDICARE PART D  * RSVPreF3 (Arexvy) CDC recommends that adults 60 years of age and older may receive a single dose of RSV vaccine using shared clinical decision-making (SCDM)   Tetanus (Td) Vaccine - COST NOT COVERED BY MEDICARE PART B Following completion of primary series, a booster dose should be given every 10 years to maintain immunity against tetanus. Td may also be given as tetanus wound prophylaxis.   Tdap Vaccine - COST NOT COVERED BY MEDICARE PART B Recommended at least once for all adults. For pregnant patients, recommended with each pregnancy.   Shingles Vaccine (Shingrix) - COST NOT COVERED BY MEDICARE PART B  2 shot series recommended in those 19 years and older who have or will have weakened immune systems or those 50 years and older     Health Maintenance Due:      Topic Date Due   • Hepatitis C Screening  Never done   • Breast Cancer Screening: Mammogram  06/28/2026   • Colorectal Cancer Screening  10/11/2026     Immunizations Due:      Topic Date Due   • Influenza Vaccine (1) 09/01/2024   • COVID-19 Vaccine (5 - 2023-24 season) 09/01/2024     Advance Directives   What are advance directives?  Advance directives are legal documents  that state your wishes and plans for medical care. These plans are made ahead of time in case you lose your ability to make decisions for yourself. Advance directives can apply to any medical decision, such as the treatments you want, and if you want to donate organs.   What are the types of advance directives?  There are many types of advance directives, and each state has rules about how to use them. You may choose a combination of any of the following:  Living will:  This is a written record of the treatment you want. You can also choose which treatments you do not want, which to limit, and which to stop at a certain time. This includes surgery, medicine, IV fluid, and tube feedings.   Durable power of  for healthcare (DPAHC):  This is a written record that states who you want to make healthcare choices for you when you are unable to make them for yourself. This person, called a proxy, is usually a family member or a friend. You may choose more than 1 proxy.  Do not resuscitate (DNR) order:  A DNR order is used in case your heart stops beating or you stop breathing. It is a request not to have certain forms of treatment, such as CPR. A DNR order may be included in other types of advance directives.  Medical directive:  This covers the care that you want if you are in a coma, near death, or unable to make decisions for yourself. You can list the treatments you want for each condition. Treatment may include pain medicine, surgery, blood transfusions, dialysis, IV or tube feedings, and a ventilator (breathing machine).  Values history:  This document has questions about your views, beliefs, and how you feel and think about life. This information can help others choose the care that you would choose.  Why are advance directives important?  An advance directive helps you control your care. Although spoken wishes may be used, it is better to have your wishes written down. Spoken wishes can be misunderstood, or  not followed. Treatments may be given even if you do not want them. An advance directive may make it easier for your family to make difficult choices about your care.   Weight Management   Why it is important to manage your weight:  Being overweight increases your risk of health conditions such as heart disease, high blood pressure, type 2 diabetes, and certain types of cancer. It can also increase your risk for osteoarthritis, sleep apnea, and other respiratory problems. Aim for a slow, steady weight loss. Even a small amount of weight loss can lower your risk of health problems.  How to lose weight safely:  A safe and healthy way to lose weight is to eat fewer calories and get regular exercise. You can lose up about 1 pound a week by decreasing the number of calories you eat by 500 calories each day.   Healthy meal plan for weight management:  A healthy meal plan includes a variety of foods, contains fewer calories, and helps you stay healthy. A healthy meal plan includes the following:  Eat whole-grain foods more often.  A healthy meal plan should contain fiber. Fiber is the part of grains, fruits, and vegetables that is not broken down by your body. Whole-grain foods are healthy and provide extra fiber in your diet. Some examples of whole-grain foods are whole-wheat breads and pastas, oatmeal, brown rice, and bulgur.  Eat a variety of vegetables every day.  Include dark, leafy greens such as spinach, kale, jimenez greens, and mustard greens. Eat yellow and orange vegetables such as carrots, sweet potatoes, and winter squash.   Eat a variety of fruits every day.  Choose fresh or canned fruit (canned in its own juice or light syrup) instead of juice. Fruit juice has very little or no fiber.  Eat low-fat dairy foods.  Drink fat-free (skim) milk or 1% milk. Eat fat-free yogurt and low-fat cottage cheese. Try low-fat cheeses such as mozzarella and other reduced-fat cheeses.  Choose meat and other protein foods that  are low in fat.  Choose beans or other legumes such as split peas or lentils. Choose fish, skinless poultry (chicken or turkey), or lean cuts of red meat (beef or pork). Before you cook meat or poultry, cut off any visible fat.   Use less fat and oil.  Try baking foods instead of frying them. Add less fat, such as margarine, sour cream, regular salad dressing and mayonnaise to foods. Eat fewer high-fat foods. Some examples of high-fat foods include french fries, doughnuts, ice cream, and cakes.  Eat fewer sweets.  Limit foods and drinks that are high in sugar. This includes candy, cookies, regular soda, and sweetened drinks.  Exercise:  Exercise at least 30 minutes per day on most days of the week. Some examples of exercise include walking, biking, dancing, and swimming. You can also fit in more physical activity by taking the stairs instead of the elevator or parking farther away from stores. Ask your healthcare provider about the best exercise plan for you.      © Copyright [employer] 2018 Information is for End User's use only and may not be sold, redistributed or otherwise used for commercial purposes. All illustrations and images included in CareNotes® are the copyrighted property of [employer] or [employer]

## 2024-10-07 NOTE — PROGRESS NOTES
Ambulatory Visit  Name: Melyssa Payton      : 1950      MRN: [id number]  Encounter Provider: Todd Pleitez MD  Encounter Date: 10/7/2024   Encounter department: [facility]  Medicare annual wellness visit, subsequent    Orders:    CBC; Future    Comprehensive metabolic panel; Future    Lipid panel; Future    Benign essential hypertension  Hypertension.  The patient's blood pressure is stable at this time and he will continue current regimen of medications    Orders:    CBC; Future    Comprehensive metabolic panel; Future    Lipid panel; Future    Hyperlipidemia, unspecified hyperlipidemia type  Hyperlipidemia.  Patient will check lipid panel blood work and continue with current dose of statin therapy    Orders:    CBC; Future    Comprehensive metabolic panel; Future    Lipid panel; Future    Carcinoma of thyroid (HCC)  History of thyroid cancer.  Patient sees her general surgeon later this month for surveillance of prior history of thyroid cancer.            Preventive health issues were discussed with patient, and age appropriate screening tests were ordered as noted in patient's After Visit Summary. Personalized health advice and appropriate referrals for health education or preventive services given if needed, as noted in patient's After Visit Summary.    History of Present Illness     Patient in the office to review chronic medical conditions.  She denies any recent illness.  She has been limited in her physical activity due to chronic hip and back pain for which she sees orthopedist.  She has been able to lose 10 pounds with dietary changes over the past 6 months.       Patient Care Team:  Todd Pleitez MD as PCP - General  Todd Pleitez MD    Review of Systems   Constitutional: Negative.    HENT: Negative.     Eyes: Negative.    Respiratory: Negative.     Cardiovascular: Negative.    Gastrointestinal: Negative.    Genitourinary: Negative.     Musculoskeletal:  Positive for arthralgias, back pain and gait problem.   Skin: Negative.    Psychiatric/Behavioral: Negative.       Medical History Reviewed by provider this encounter:  [facility]       Annual Wellness Visit Questionnaire   Melyssa is here for her Subsequent Wellness visit.     Health Risk Assessment:   Patient rates overall health as very good. Patient feels that their physical health rating is same. Patient is satisfied with their life. Eyesight was rated as same. Hearing was rated as same. Patient feels that their emotional and mental health rating is same. Patients states they are never, rarely angry. Patient states they are never, rarely unusually tired/fatigued. Pain experienced in the last 7 days has been some. Patient's pain rating has been 1/10. Patient states that she has experienced no weight loss or gain in last 6 months.     Depression Screening:   PHQ-2 Score: 0      Fall Risk Screening:   In the past year, patient has experienced: no history of falling in past year      Urinary Incontinence Screening:   Patient has not leaked urine accidently in the last six months.     Home Safety:  Patient does not have trouble with stairs inside or outside of their home. Patient has working smoke alarms and has no working carbon monoxide detector. Home safety hazards include: none.     Nutrition:   Current diet is Diabetic, Low Cholesterol, Low Carb, No Added Salt and Limited junk food.     Medications:   Patient is currently taking over-the-counter supplements. OTC medications include: see medication list. Patient is able to manage medications.     Activities of Daily Living (ADLs)/Instrumental Activities of Daily Living (IADLs):   Walk and transfer into and out of bed and chair?: Yes  Dress and groom yourself?: Yes    Bathe or shower yourself?: Yes    Feed yourself? Yes  Do your laundry/housekeeping?: Yes  Manage your money, pay your bills and track your expenses?: Yes  Make your own meals?: Yes     Do your own shopping?: Yes    Previous Hospitalizations:   Any hospitalizations or ED visits within the last 12 months?: No      Advance Care Planning:   Living will: Yes    Durable POA for healthcare: Yes    Advanced directive: Yes      PREVENTIVE SCREENINGS      Cardiovascular Screening:    General: History Lipid Disorder and Risks and Benefits Discussed    Due for: Lipid Panel      Diabetes Screening:     General: Screening Current      Colorectal Cancer Screening:     General: Screening Current      Breast Cancer Screening:     General: Screening Current      Cervical Cancer Screening:    General: Screening Not Indicated      Lung Cancer Screening:     General: Screening Not Indicated    Screening, Brief Intervention, and Referral to Treatment (SBIRT)    Screening  Typical number of drinks in a day: 0  Typical number of drinks in a week: 0  Interpretation: Low risk drinking behavior.    AUDIT-C Screenin) How often did you have a drink containing alcohol in the past year? monthly or less  2) How many drinks did you have on a typical day when you were drinking in the past year? 0  3) How often did you have 6 or more drinks on one occasion in the past year? never    AUDIT-C Score: 1  Interpretation: Score 0-2 (female): Negative screen for alcohol misuse    Single Item Drug Screening:  How often have you used an illegal drug (including marijuana) or a prescription medication for non-medical reasons in the past year? never    Single Item Drug Screen Score: 0  Interpretation: Negative screen for possible drug use disorder    Social Determinants of Health     Financial Resource Strain: Low Risk  (2023)    Overall Financial Resource Strain (CARDIA)     Difficulty of Paying Living Expenses: Not hard at all   Food Insecurity: No Food Insecurity (2024)    Hunger Vital Sign     Worried About Running Out of Food in the Last Year: Never true     Ran Out of Food in the Last Year: Never true   Transportation  Needs: No Transportation Needs (9/30/2024)    PRAPARE - Transportation     Lack of Transportation (Medical): No     Lack of Transportation (Non-Medical): No   Housing Stability: Low Risk  (9/30/2024)    Housing Stability Vital Sign     Unable to Pay for Housing in the Last Year: No     Number of Times Moved in the Last Year: 0     Homeless in the Last Year: No   Utilities: Not At Risk (9/30/2024)    [facility] Utilities     Threatened with loss of utilities: No     No results found.    Objective     /88 (BP Location: Left arm, Patient Position: Sitting, Cuff Size: Large)   Pulse 77   Temp 98 °F (36.7 °C) (Temporal)   Resp 16   Ht 5' (1.524 m)   Wt 75.3 kg (166 lb)   LMP  (LMP Unknown)   SpO2 99%   BMI 32.42 kg/m²     Physical Exam  Vitals and nursing note reviewed.   Constitutional:       General: She is not in acute distress.     Appearance: Normal appearance. She is well-developed. She is not ill-appearing.   HENT:      Head: Normocephalic and atraumatic.   Eyes:      General:         Right eye: No discharge.         Left eye: No discharge.      Extraocular Movements: Extraocular movements intact.      Conjunctiva/sclera: Conjunctivae normal.      Pupils: Pupils are equal, round, and reactive to light.   Neck:      Vascular: No carotid bruit.   Cardiovascular:      Rate and Rhythm: Normal rate and regular rhythm.      Heart sounds: No murmur heard.  Pulmonary:      Effort: Pulmonary effort is normal. No respiratory distress.      Breath sounds: Normal breath sounds.   Abdominal:      General: Abdomen is flat. Bowel sounds are normal.      Palpations: Abdomen is soft.      Tenderness: There is no abdominal tenderness. There is no guarding or rebound.   Musculoskeletal:      Right lower leg: No edema.      Left lower leg: No edema.   Lymphadenopathy:      Cervical: No cervical adenopathy.   Skin:     General: Skin is warm and dry.      Capillary Refill: Capillary refill takes less than 2 seconds.    Neurological:      Mental Status: She is alert. Mental status is at baseline.   Psychiatric:         Mood and Affect: Mood normal.         Behavior: Behavior normal.         Thought Content: Thought content normal.         Judgment: Judgment normal.

## 2024-10-14 ENCOUNTER — TELEPHONE (OUTPATIENT)
Age: 74
End: 2024-10-14

## 2024-10-14 NOTE — TELEPHONE ENCOUNTER
Pt called for Covid vaccine, she and her  Emmanuel Payton would like ot come in together. Please advise

## 2024-10-25 ENCOUNTER — OFFICE VISIT (OUTPATIENT)
Dept: FAMILY MEDICINE CLINIC | Facility: CLINIC | Age: 74
End: 2024-10-25
Payer: MEDICARE

## 2024-10-25 VITALS
OXYGEN SATURATION: 97 % | DIASTOLIC BLOOD PRESSURE: 90 MMHG | WEIGHT: 169.6 LBS | BODY MASS INDEX: 33.3 KG/M2 | RESPIRATION RATE: 16 BRPM | SYSTOLIC BLOOD PRESSURE: 140 MMHG | HEART RATE: 86 BPM | TEMPERATURE: 98.2 F | HEIGHT: 60 IN

## 2024-10-25 DIAGNOSIS — J30.9 ALLERGIC RHINITIS, UNSPECIFIED SEASONALITY, UNSPECIFIED TRIGGER: Primary | ICD-10-CM

## 2024-10-25 DIAGNOSIS — R09.82 POST-NASAL DRIP: ICD-10-CM

## 2024-10-25 PROCEDURE — G2211 COMPLEX E/M VISIT ADD ON: HCPCS | Performed by: NURSE PRACTITIONER

## 2024-10-25 PROCEDURE — 99213 OFFICE O/P EST LOW 20 MIN: CPT | Performed by: NURSE PRACTITIONER

## 2024-10-25 RX ORDER — FLUTICASONE PROPIONATE 50 MCG
2 SPRAY, SUSPENSION (ML) NASAL DAILY
Qty: 18 ML | Refills: 1 | Status: SHIPPED | OUTPATIENT
Start: 2024-10-25

## 2024-10-25 NOTE — PROGRESS NOTES
"Ambulatory Visit  Name: Melyssa Payton      : 1950      MRN: [id number]  Encounter Provider: MAK Hahn  Encounter Date: 10/25/2024   Encounter department: [facility]    Assessment & Plan  Allergic rhinitis, unspecified seasonality, unspecified trigger  Allergic rhinitis  Start flonase 2 sprays each nostril daily  It takes 2-4 weeks for flonase to provide full symptom relief.  Drink plenty of fluids to help with post nasal drip.  Call if symptoms get worse or are not improving in two weeks.      Orders:    fluticasone (FLONASE) 50 mcg/act nasal spray; 2 sprays into each nostril daily    Post-nasal drip    Orders:    fluticasone (FLONASE) 50 mcg/act nasal spray; 2 sprays into each nostril daily       History of Present Illness     HPI  Started with cough a week ago, \"cough gags me at times\".   First few days cough was productive with tan tinge to it  Cough worse at night, throat gets irritated  Denies PND during day, feels it at night after coughing  Runny nose in the beginning  Denies fever  Denies chills  Denies SOB, Denies chest pain  Did not take home COVID test  No sick contacts  Denies N/V/D    Took cough medicine the last few days, nothing today.  Uses cough drops at night to help coat throat to stop cough    Had COVID shot last Tuesday, had aches for a day or two post injection.     Pt was at eye doctor this morning, eye still dilated.  No glaucoma at this time per pt.     History obtained from : patient  Review of Systems   Constitutional:  Negative for chills and fever.   HENT:  Positive for postnasal drip and rhinorrhea. Negative for congestion, ear pain, sinus pain, sneezing and sore throat.    Respiratory:  Positive for cough. Negative for shortness of breath.    Cardiovascular:  Negative for chest pain.   Gastrointestinal:  Negative for diarrhea, nausea and vomiting.   Allergic/Immunologic: Negative for environmental allergies.     Current Outpatient " Medications on File Prior to Visit   Medication Sig Dispense Refill    Glucosamine-Chondroit-Vit C-Mn (TH GLUCOSAMINE-CHONDROITIN) CAPS Take by mouth      levothyroxine (Euthyrox) 75 mcg tablet Take 1 tablet (75 mcg total) by mouth daily 90 tablet 1    losartan (COZAAR) 100 MG tablet TAKE 1 TABLET DAILY 90 tablet 1    MULTIPLE VITAMINS PO Take by mouth      Omega-3 Fatty Acids (FISH OIL) 1,000 mg Take by mouth      pravastatin (PRAVACHOL) 40 mg tablet TAKE 1 TABLET DAILY 90 tablet 1    Red Yeast Rice 600 MG TABS Take by mouth      [DISCONTINUED] predniSONE 20 mg tablet 2 TABS DAILY x 3 DAYS, 1.5 TABS DAILY x 3 DAYS, 1 TAB DAILY x 3 DAYS, 0.5 TAB DAILY X 3 DAYS 15 tablet 0    amoxicillin (AMOXIL) 500 mg capsule TAKE 4 CAPSULES 1 HOUR PRIOR TO DENTAL TREATMENT (Patient not taking: Reported on 10/7/2024)      [DISCONTINUED] levothyroxine (Euthyrox) 100 mcg tablet Take 1 tablet (100 mcg total) by mouth daily in the early morning (Patient not taking: Reported on 10/7/2024) 90 tablet 3    [DISCONTINUED] levothyroxine (Euthyrox) 75 mcg tablet Take 1 tablet (75 mcg total) by mouth daily (Patient not taking: Reported on 4/30/2024) 90 tablet 3    [DISCONTINUED] levothyroxine (Euthyrox) 88 mcg tablet Take 1 tablet (88 mcg total) by mouth daily (Patient not taking: Reported on 4/30/2024) 90 tablet 3     No current facility-administered medications on file prior to visit.          Objective     /90 (BP Location: Left arm, Patient Position: Sitting, Cuff Size: Standard)   Pulse 86   Temp 98.2 °F (36.8 °C) (Temporal)   Resp 16   Ht 5' (1.524 m)   Wt 76.9 kg (169 lb 9.6 oz)   LMP  (LMP Unknown)   SpO2 97%   BMI 33.12 kg/m²     Physical Exam  Constitutional:       Appearance: Normal appearance.   HENT:      Head: Normocephalic.      Right Ear: External ear normal. There is impacted cerumen.      Left Ear: External ear normal. There is impacted cerumen.      Nose: No congestion.      Comments: Turbinates pale  bilaterally     Mouth/Throat:      Mouth: Mucous membranes are moist.      Comments: PND noted back of throat  Eyes:      Conjunctiva/sclera: Conjunctivae normal.      Comments: Pupils dilated from eye doctor earlier today   Cardiovascular:      Rate and Rhythm: Normal rate and regular rhythm.   Pulmonary:      Effort: Pulmonary effort is normal. No respiratory distress.      Breath sounds: No wheezing or rhonchi.   Musculoskeletal:      Cervical back: No tenderness.   Lymphadenopathy:      Cervical: No cervical adenopathy.   Skin:     General: Skin is warm and dry.   Neurological:      Mental Status: She is alert.   Psychiatric:         Mood and Affect: Mood normal.         Behavior: Behavior normal.

## 2024-10-28 NOTE — PROGRESS NOTES
Daily Note     Today's date: 2024  Patient name: Melyssa Payton  : 1950  MRN: [id number]  Referring provider: Jose Cm MD  Dx:   Encounter Diagnosis     ICD-10-CM    1. Left hip pain  M25.552           Start Time: 801  Stop Time: 842  Total time in clinic (min): 41 minutes    Subjective: Melyssa says she was sore after last session and mowing the grass, but has gotten better since.       Objective: See treatment diary below      Assessment: Continued with POC outlined from prior session due to soreness. Tolerated treatment well. Patient has mild muscular tension throughout left hip abductor musculature. Continued with lower volume of soft tissue mobilizations without symptom reproduction. Hip ROM is roughly equal bilaterally- Held on joint mobilizations. Reassess hip motion next session. Continued with interventions to improve strength, range of motion and functional mobility. Control during isometric interventions is improving, but patient continues to exhibit compensations for hip abductor strength deficits with L>R. Patient demonstrated fatigue post treatment, and would benefit from continued skilled physical therapy.       Plan: Continue per plan of care.      Precautions: Hx L. TKA 2017. Hx thyroid cancer.       Manuals 5/1 5/7 5/9 4/9 4/11 4/15 4/18 4/22 4/25 4/29   Caudo lateral distraction Supine gr III-IV into ER/ Flex Supine gr IV into ER/ Flex LM Np Supine gr 3-4 LM Supine gr 3-4 LM   Supine gr III-IV into ER Supine gr III-IV into ER Supine gr III-IV into ER/ Flex   PA mobs    Gr 3-4 LM prone with ER Gr 3-4 lm prone with ER.  Gr 3-4 LM prone  with ER Gr 3-4 prone with ER      Assessment     LM  LM LM     STM L. Glute med LM L. Glute med LM L glute med LM  L. Hip flex LM L. Hip flex LM  L. Glute med LM L glute med LM NP   Neuro Re-Ed       [facility]             Balance board    Taps x2' ea  Taps x 2' ea  Taps x 2' lat     Hip extension      3x10 at  "wall with slider        Captain cody 6x30\" ea ball  Vc & TC 4x30\" ea  4x30\" ea vc      No ball 2x45\" ea  With ball 2x45\" ea Ball 4x30\" ea VC   S/l hip abd   Poss nv                                    Ther Ex    4/9         Bridge 3x10 RHB 2\" hold 3x10 2\" hold  RHB 3x10 2-3\" hold 3x10 3\" hold YHB 3x10 3\" hold YHB 3x10 3\" hold YHB increase NV RHB 3x10 RHB RHB 3x10  RHB 3x10 3x10 RHB   clamshell    2x10 R s/l pillow between knees Np- hold( p) Np       Lateral walks  YHB 3 laps at counter YHB 3 laps at counter  YHB 3 laps at counter Np YHB 3 laps at counter YHB 3 laps at counter YHB 3 laps at counter     VG L7 5' YHB L7 5' YHB L7 5' YHB L6 6'   L6 6' L7 5' L7 5'  L7 4' YHB   KENNETH    Left leg back, counter support 1x10 HEP        Supine march SLR 3x10 weight poss NV SLR 3x10 1# SLR 3x10 1#- standing march poss NV  1x10 MRE, Light orange 2x8 on thigh  HEP- no resistance 3x10 light orange on thigh HEP 2x10 light orange on thigh-  Knee in ~20 deg ext SLR 2x8 SLR 3x8 SLR 3x10   Leg extension     Gtb 1x10 increase nv 10# 3x10 10# 3x10 10# 3x10     Step ups Lateral 2x10 4\"  2x10 6\" Lateral 6\" 3x10 Lateral 6 \" 3x10     Lateral 2x8 4\" Lateral 2x8 4\" ea Lateral 3x8 4\" ea.    Ther Activity                                       Gait Training             Kenneth walk 4 hurdles, 4 laps VC 6 hurdles 5 laps + 5lbs RUE.  6 hurdles 3 laps R & L hands 6#                       Modalities                                                                " OBSERVATION

## 2024-10-30 ENCOUNTER — OFFICE VISIT (OUTPATIENT)
Dept: SURGERY | Facility: CLINIC | Age: 74
End: 2024-10-30
Payer: MEDICARE

## 2024-10-30 VITALS — WEIGHT: 162.4 LBS | BODY MASS INDEX: 31.88 KG/M2 | TEMPERATURE: 98.6 F | HEIGHT: 60 IN

## 2024-10-30 DIAGNOSIS — Z85.850 HISTORY OF THYROID CANCER: Primary | ICD-10-CM

## 2024-10-30 PROCEDURE — 99213 OFFICE O/P EST LOW 20 MIN: CPT | Performed by: SURGERY

## 2024-10-30 NOTE — PROGRESS NOTES
Ambulatory Visit  Name: Melyssa Payton      : 1950      MRN: [id number]  Encounter Provider: Harjit Yeager MD  Encounter Date: 10/30/2024   Encounter department: [facility] [place]    Assessment & Plan  History of thyroid cancer  Remains to be disease-free.  Will recheck labs.  Return 1 year  Orders:    TSH, 3rd generation with Free T4 reflex    Thyroglobulin Panel; Future    Thyroid Antibodies Panel; Future    T4, free; Future      History of Present Illness     Melyssa Payton is a 74 y.o. female who presents for thyroid follow-up.  No new complaints or problems regarding her thyroid.  No hyper- or hypoactivity.  No change in voice or other swallowing issues.      Review of Systems   Constitutional:  Negative for chills and fever.   HENT:  Negative for trouble swallowing and voice change.    Eyes:  Negative for pain and visual disturbance.   Respiratory:  Negative for cough and shortness of breath.    Cardiovascular:  Negative for chest pain and leg swelling.   Gastrointestinal:  Negative for abdominal pain, nausea and vomiting.   Endocrine: Negative for cold intolerance, heat intolerance, polydipsia, polyphagia and polyuria.   Genitourinary:  Negative for difficulty urinating and flank pain.   Musculoskeletal:  Negative for arthralgias and gait problem.   Skin:  Negative for rash and wound.   Allergic/Immunologic: Negative for food allergies.   Neurological:  Negative for seizures, syncope, weakness and headaches.   Hematological:  Negative for adenopathy.   Psychiatric/Behavioral:  Negative for confusion.    All other systems reviewed and are negative.          Objective     Temp 98.6 °F (37 °C) (Temporal)   Ht 5' (1.524 m)   Wt 73.7 kg (162 lb 6.4 oz)   LMP  (LMP Unknown)   BMI 31.72 kg/m²     Physical Exam  Vitals and nursing note reviewed.   Constitutional:       General: She is not in acute distress.     Appearance: She is well-developed. She is not  diaphoretic.   HENT:      Head: Normocephalic and atraumatic.      Right Ear: External ear normal.      Left Ear: External ear normal.   Eyes:      General: No scleral icterus.     Conjunctiva/sclera: Conjunctivae normal.   Neck:      Thyroid: No thyromegaly.      Trachea: No tracheal deviation.      Comments: Anterior well-healed neck incision, no thyroid palpable no nodes anywhere in the neck appreciated  Cardiovascular:      Rate and Rhythm: Normal rate and regular rhythm.      Heart sounds: Normal heart sounds. No murmur heard.     No friction rub.   Pulmonary:      Effort: Pulmonary effort is normal. No respiratory distress.      Breath sounds: Normal breath sounds. No wheezing or rales.   Abdominal:      General: There is no distension.      Palpations: Abdomen is soft. There is no mass.      Tenderness: There is no abdominal tenderness. There is no guarding or rebound.   Musculoskeletal:         General: Normal range of motion.      Cervical back: Neck supple.   Lymphadenopathy:      Cervical: No cervical adenopathy.   Skin:     General: Skin is warm and dry.   Neurological:      Mental Status: She is alert and oriented to person, place, and time.   Psychiatric:         Behavior: Behavior normal.         Thought Content: Thought content normal.         Judgment: Judgment normal.

## 2024-10-30 NOTE — ASSESSMENT & PLAN NOTE
Remains to be disease-free.  Will recheck labs.  Return 1 year  Orders:    TSH, 3rd generation with Free T4 reflex    Thyroglobulin Panel; Future    Thyroid Antibodies Panel; Future    T4, free; Future

## 2024-10-31 ENCOUNTER — OFFICE VISIT (OUTPATIENT)
Dept: FAMILY MEDICINE CLINIC | Facility: CLINIC | Age: 74
End: 2024-10-31
Payer: MEDICARE

## 2024-10-31 VITALS
DIASTOLIC BLOOD PRESSURE: 80 MMHG | OXYGEN SATURATION: 96 % | TEMPERATURE: 97.8 F | HEART RATE: 97 BPM | BODY MASS INDEX: 33.18 KG/M2 | RESPIRATION RATE: 18 BRPM | WEIGHT: 169 LBS | HEIGHT: 60 IN | SYSTOLIC BLOOD PRESSURE: 180 MMHG

## 2024-10-31 DIAGNOSIS — R05.1 ACUTE COUGH: Primary | ICD-10-CM

## 2024-10-31 PROCEDURE — G2211 COMPLEX E/M VISIT ADD ON: HCPCS | Performed by: FAMILY MEDICINE

## 2024-10-31 PROCEDURE — 99213 OFFICE O/P EST LOW 20 MIN: CPT | Performed by: FAMILY MEDICINE

## 2024-10-31 RX ORDER — PREDNISONE 10 MG/1
TABLET ORAL
Qty: 20 TABLET | Refills: 0 | Status: SHIPPED | OUTPATIENT
Start: 2024-10-31 | End: 2024-11-08

## 2024-10-31 RX ORDER — BENZONATATE 200 MG/1
200 CAPSULE ORAL 3 TIMES DAILY PRN
Qty: 20 CAPSULE | Refills: 0 | Status: SHIPPED | OUTPATIENT
Start: 2024-10-31

## 2024-10-31 NOTE — PROGRESS NOTES
Ambulatory Visit  Name: Melyssa Payton      : 1950      MRN: [id number]  Encounter Provider: Lane Schafer DO  Encounter Date: 10/31/2024   Encounter department: [facility]    Assessment & Plan  Acute cough  Likely bronchitis. Recommend Tessalon and Prednisone taper. Follow up if not improved. Can use symptom directed treatment and supportive care otherwise.  Orders:    benzonatate (TESSALON) 200 MG capsule; Take 1 capsule (200 mg total) by mouth 3 (three) times a day as needed for cough    predniSONE 10 mg tablet; Take 4 tablets (40 mg total) by mouth daily for 2 days, THEN 3 tablets (30 mg total) daily for 2 days, THEN 2 tablets (20 mg total) daily for 2 days, THEN 1 tablet (10 mg total) daily for 2 days.       History of Present Illness     HPI    Coughing and gagging for over a week. Has been using Robitussin and honey cough syrup, nasal spray. Taking cough drops. No fever, chills, myalgias. No congestion, sneezing, or sore throat.      Review of Systems        Objective     BP (!) 180/80   Pulse 97   Temp 97.8 °F (36.6 °C) (Temporal)   Resp 18   Ht 5' (1.524 m)   Wt 76.7 kg (169 lb)   LMP  (LMP Unknown)   SpO2 96%   BMI 33.01 kg/m²     Physical Exam  Harsh cough  Wheezing throughout lung fields.

## 2024-11-05 ENCOUNTER — APPOINTMENT (OUTPATIENT)
Dept: LAB | Facility: CLINIC | Age: 74
End: 2024-11-05
Payer: MEDICARE

## 2024-11-05 DIAGNOSIS — Z85.850 HISTORY OF THYROID CANCER: ICD-10-CM

## 2024-11-05 DIAGNOSIS — I10 BENIGN ESSENTIAL HYPERTENSION: ICD-10-CM

## 2024-11-05 DIAGNOSIS — Z00.00 MEDICARE ANNUAL WELLNESS VISIT, SUBSEQUENT: ICD-10-CM

## 2024-11-05 DIAGNOSIS — E78.5 HYPERLIPIDEMIA, UNSPECIFIED HYPERLIPIDEMIA TYPE: ICD-10-CM

## 2024-11-05 LAB
ALBUMIN SERPL BCG-MCNC: 4.2 G/DL (ref 3.5–5)
ALP SERPL-CCNC: 71 U/L (ref 34–104)
ALT SERPL W P-5'-P-CCNC: 16 U/L (ref 7–52)
ANION GAP SERPL CALCULATED.3IONS-SCNC: 11 MMOL/L (ref 4–13)
AST SERPL W P-5'-P-CCNC: 15 U/L (ref 13–39)
BILIRUB SERPL-MCNC: 0.48 MG/DL (ref 0.2–1)
BUN SERPL-MCNC: 26 MG/DL (ref 5–25)
CALCIUM SERPL-MCNC: 8.6 MG/DL (ref 8.4–10.2)
CHLORIDE SERPL-SCNC: 105 MMOL/L (ref 96–108)
CHOLEST SERPL-MCNC: 206 MG/DL
CO2 SERPL-SCNC: 26 MMOL/L (ref 21–32)
CREAT SERPL-MCNC: 0.86 MG/DL (ref 0.6–1.3)
ERYTHROCYTE [DISTWIDTH] IN BLOOD BY AUTOMATED COUNT: 13.1 % (ref 11.6–15.1)
GFR SERPL CREATININE-BSD FRML MDRD: 66 ML/MIN/1.73SQ M
GLUCOSE P FAST SERPL-MCNC: 86 MG/DL (ref 65–99)
HCT VFR BLD AUTO: 38.9 % (ref 34.8–46.1)
HDLC SERPL-MCNC: 77 MG/DL
HGB BLD-MCNC: 12.6 G/DL (ref 11.5–15.4)
LDLC SERPL CALC-MCNC: 104 MG/DL (ref 0–100)
MCH RBC QN AUTO: 29.8 PG (ref 26.8–34.3)
MCHC RBC AUTO-ENTMCNC: 32.4 G/DL (ref 31.4–37.4)
MCV RBC AUTO: 92 FL (ref 82–98)
NONHDLC SERPL-MCNC: 129 MG/DL
PLATELET # BLD AUTO: 318 THOUSANDS/UL (ref 149–390)
PMV BLD AUTO: 9.9 FL (ref 8.9–12.7)
POTASSIUM SERPL-SCNC: 4.3 MMOL/L (ref 3.5–5.3)
PROT SERPL-MCNC: 7.1 G/DL (ref 6.4–8.4)
RBC # BLD AUTO: 4.23 MILLION/UL (ref 3.81–5.12)
SODIUM SERPL-SCNC: 142 MMOL/L (ref 135–147)
T4 FREE SERPL-MCNC: 1.16 NG/DL (ref 0.61–1.12)
TRIGL SERPL-MCNC: 124 MG/DL
TSH SERPL DL<=0.05 MIU/L-ACNC: 0.16 UIU/ML (ref 0.45–4.5)
WBC # BLD AUTO: 9.05 THOUSAND/UL (ref 4.31–10.16)

## 2024-11-05 PROCEDURE — 86376 MICROSOMAL ANTIBODY EACH: CPT

## 2024-11-05 PROCEDURE — 80053 COMPREHEN METABOLIC PANEL: CPT

## 2024-11-05 PROCEDURE — 85027 COMPLETE CBC AUTOMATED: CPT

## 2024-11-05 PROCEDURE — 36415 COLL VENOUS BLD VENIPUNCTURE: CPT

## 2024-11-05 PROCEDURE — 86800 THYROGLOBULIN ANTIBODY: CPT

## 2024-11-05 PROCEDURE — 84443 ASSAY THYROID STIM HORMONE: CPT | Performed by: SURGERY

## 2024-11-05 PROCEDURE — 84439 ASSAY OF FREE THYROXINE: CPT

## 2024-11-05 PROCEDURE — 80061 LIPID PANEL: CPT

## 2024-11-06 ENCOUNTER — TELEPHONE (OUTPATIENT)
Age: 74
End: 2024-11-06

## 2024-11-06 LAB
THYROGLOB AB SERPL-ACNC: <1 IU/ML (ref 0–0.9)
THYROPEROXIDASE AB SERPL-ACNC: 11 IU/ML (ref 0–34)

## 2024-11-06 NOTE — TELEPHONE ENCOUNTER
Patient called still having the cough, she has 2 days left of the steroid medication she was prescribed and was wondering if Alpesh Schafer could prescribe her something else. Please advise     [id number] - JESSA MENDOZA - [address] [place] [address] STREET

## 2024-11-07 NOTE — TELEPHONE ENCOUNTER
I would like to re-evaluate her since she previously had wheezing throughout the lungs to see if that has improved with the current medications she is taking. Please schedule for follow up.

## 2024-11-07 NOTE — TELEPHONE ENCOUNTER
Spoke with pt, she declines appt at this time stating that she started to feel better yesterday after she had already called. Pt states that she does feel better today as well. Advised pt call the office to schedule an appt if symptoms return or proceed to nearest ED/UC for evaluation.  Pt agrees with plan.

## 2024-12-03 DIAGNOSIS — C73 CARCINOMA OF THYROID (HCC): ICD-10-CM

## 2024-12-04 RX ORDER — LEVOTHYROXINE SODIUM 75 UG/1
75 TABLET ORAL DAILY
Qty: 90 TABLET | Refills: 1 | Status: SHIPPED | OUTPATIENT
Start: 2024-12-04

## 2025-02-14 DIAGNOSIS — I10 ESSENTIAL HYPERTENSION: ICD-10-CM

## 2025-02-14 DIAGNOSIS — E78.5 HYPERLIPIDEMIA, UNSPECIFIED HYPERLIPIDEMIA TYPE: ICD-10-CM

## 2025-02-14 RX ORDER — LOSARTAN POTASSIUM 100 MG/1
100 TABLET ORAL DAILY
Qty: 90 TABLET | Refills: 1 | Status: SHIPPED | OUTPATIENT
Start: 2025-02-14

## 2025-02-14 RX ORDER — PRAVASTATIN SODIUM 40 MG
40 TABLET ORAL DAILY
Qty: 90 TABLET | Refills: 1 | Status: SHIPPED | OUTPATIENT
Start: 2025-02-14

## 2025-05-07 DIAGNOSIS — C73 CARCINOMA OF THYROID (HCC): ICD-10-CM

## 2025-05-08 RX ORDER — LEVOTHYROXINE SODIUM 75 UG/1
75 TABLET ORAL DAILY
Qty: 90 TABLET | Refills: 1 | Status: SHIPPED | OUTPATIENT
Start: 2025-05-08

## 2025-07-23 DIAGNOSIS — I10 ESSENTIAL HYPERTENSION: ICD-10-CM

## 2025-07-23 DIAGNOSIS — E78.5 HYPERLIPIDEMIA, UNSPECIFIED HYPERLIPIDEMIA TYPE: ICD-10-CM

## 2025-07-25 RX ORDER — PRAVASTATIN SODIUM 40 MG
40 TABLET ORAL DAILY
Qty: 90 TABLET | Refills: 0 | Status: SHIPPED | OUTPATIENT
Start: 2025-07-25

## 2025-07-25 RX ORDER — LOSARTAN POTASSIUM 100 MG/1
100 TABLET ORAL DAILY
Qty: 90 TABLET | Refills: 0 | Status: SHIPPED | OUTPATIENT
Start: 2025-07-25